# Patient Record
Sex: MALE | Race: WHITE | NOT HISPANIC OR LATINO | Employment: OTHER | URBAN - METROPOLITAN AREA
[De-identification: names, ages, dates, MRNs, and addresses within clinical notes are randomized per-mention and may not be internally consistent; named-entity substitution may affect disease eponyms.]

---

## 2023-05-18 DIAGNOSIS — K21.9 GERD WITHOUT ESOPHAGITIS: Primary | ICD-10-CM

## 2023-05-19 RX ORDER — OMEPRAZOLE 40 MG/1
CAPSULE, DELAYED RELEASE ORAL
Qty: 60 CAPSULE | Refills: 0 | Status: SHIPPED | OUTPATIENT
Start: 2023-05-19 | End: 2023-06-13

## 2023-06-02 ENCOUNTER — OFFICE VISIT (OUTPATIENT)
Dept: PRIMARY CARE | Facility: CLINIC | Age: 78
End: 2023-06-02
Payer: MEDICARE

## 2023-06-02 VITALS
HEIGHT: 72 IN | WEIGHT: 284 LBS | HEART RATE: 73 BPM | BODY MASS INDEX: 38.47 KG/M2 | TEMPERATURE: 97.3 F | SYSTOLIC BLOOD PRESSURE: 129 MMHG | OXYGEN SATURATION: 95 % | DIASTOLIC BLOOD PRESSURE: 69 MMHG

## 2023-06-02 DIAGNOSIS — E11.9 CONTROLLED TYPE 2 DIABETES MELLITUS WITHOUT COMPLICATION, WITHOUT LONG-TERM CURRENT USE OF INSULIN (MULTI): ICD-10-CM

## 2023-06-02 DIAGNOSIS — I10 ESSENTIAL HYPERTENSION: ICD-10-CM

## 2023-06-02 DIAGNOSIS — N40.1 BENIGN PROSTATIC HYPERPLASIA WITH LOWER URINARY TRACT SYMPTOMS, SYMPTOM DETAILS UNSPECIFIED: ICD-10-CM

## 2023-06-02 DIAGNOSIS — E04.2 NONTOXIC MULTINODULAR GOITER: ICD-10-CM

## 2023-06-02 DIAGNOSIS — R73.03 PREDIABETES: ICD-10-CM

## 2023-06-02 DIAGNOSIS — Z00.00 ROUTINE GENERAL MEDICAL EXAMINATION AT HEALTH CARE FACILITY: Primary | ICD-10-CM

## 2023-06-02 DIAGNOSIS — E78.2 MIXED HYPERLIPIDEMIA: ICD-10-CM

## 2023-06-02 DIAGNOSIS — E66.01 CLASS 2 SEVERE OBESITY DUE TO EXCESS CALORIES WITH SERIOUS COMORBIDITY AND BODY MASS INDEX (BMI) OF 38.0 TO 38.9 IN ADULT (MULTI): ICD-10-CM

## 2023-06-02 PROBLEM — J34.2 NASAL SEPTAL DEVIATION: Status: RESOLVED | Noted: 2023-06-02 | Resolved: 2023-06-02

## 2023-06-02 PROBLEM — H10.45 CHRONIC ALLERGIC CONJUNCTIVITIS: Status: RESOLVED | Noted: 2021-12-16 | Resolved: 2023-06-02

## 2023-06-02 PROBLEM — C44.90 MALIGNANT NEOPLASM OF SKIN: Status: RESOLVED | Noted: 2021-12-16 | Resolved: 2023-06-02

## 2023-06-02 PROBLEM — M17.0 PRIMARY OSTEOARTHRITIS OF BOTH KNEES: Status: RESOLVED | Noted: 2023-06-02 | Resolved: 2023-06-02

## 2023-06-02 PROBLEM — J32.0 CHRONIC MAXILLARY SINUSITIS: Status: RESOLVED | Noted: 2023-06-02 | Resolved: 2023-06-02

## 2023-06-02 PROBLEM — G89.29 CHRONIC LOW BACK PAIN WITH SCIATICA: Status: RESOLVED | Noted: 2018-02-08 | Resolved: 2023-06-02

## 2023-06-02 PROBLEM — Z96.1 PSEUDOPHAKIA OF BOTH EYES: Status: RESOLVED | Noted: 2021-01-22 | Resolved: 2023-06-02

## 2023-06-02 PROBLEM — K63.5 BENIGN COLONIC POLYP: Status: RESOLVED | Noted: 2023-06-02 | Resolved: 2023-06-02

## 2023-06-02 PROBLEM — N52.9 MALE ERECTILE DISORDER: Status: RESOLVED | Noted: 2023-06-02 | Resolved: 2023-06-02

## 2023-06-02 PROBLEM — H90.3 SENSORINEURAL HEARING LOSS, BILATERAL: Status: RESOLVED | Noted: 2023-06-02 | Resolved: 2023-06-02

## 2023-06-02 PROBLEM — K21.9 GASTROESOPHAGEAL REFLUX DISEASE: Status: ACTIVE | Noted: 2018-08-02

## 2023-06-02 PROBLEM — J32.2 CHRONIC ETHMOIDAL SINUSITIS: Status: RESOLVED | Noted: 2023-06-02 | Resolved: 2023-06-02

## 2023-06-02 PROBLEM — M54.40 CHRONIC LOW BACK PAIN WITH SCIATICA: Status: RESOLVED | Noted: 2018-02-08 | Resolved: 2023-06-02

## 2023-06-02 PROBLEM — M54.16 LUMBAR RADICULOPATHY: Status: RESOLVED | Noted: 2023-06-02 | Resolved: 2023-06-02

## 2023-06-02 PROBLEM — K57.30 DIVERTICULOSIS OF LARGE INTESTINE WITHOUT HEMORRHAGE: Status: RESOLVED | Noted: 2018-10-09 | Resolved: 2023-06-02

## 2023-06-02 PROBLEM — D72.820 MONOCLONAL B-CELL LYMPHOCYTOSIS: Status: ACTIVE | Noted: 2018-07-15

## 2023-06-02 PROBLEM — I25.10 NONOBSTRUCTIVE ATHEROSCLEROSIS OF CORONARY ARTERY: Status: ACTIVE | Noted: 2023-06-02

## 2023-06-02 PROBLEM — Z86.69 HISTORY OF BELL'S PALSY: Status: RESOLVED | Noted: 2021-12-16 | Resolved: 2023-06-02

## 2023-06-02 PROBLEM — M65.4 TENOSYNOVITIS, DE QUERVAIN: Status: RESOLVED | Noted: 2023-06-02 | Resolved: 2023-06-02

## 2023-06-02 PROBLEM — M48.061 SPINAL STENOSIS OF LUMBAR REGION: Status: RESOLVED | Noted: 2018-02-08 | Resolved: 2023-06-02

## 2023-06-02 PROBLEM — G47.33 OBSTRUCTIVE SLEEP APNEA SYNDROME: Status: ACTIVE | Noted: 2017-01-10

## 2023-06-02 LAB
CHOLESTEROL (MG/DL) IN SER/PLAS: 148 MG/DL (ref 0–199)
CHOLESTEROL IN HDL (MG/DL) IN SER/PLAS: 55.2 MG/DL
CHOLESTEROL/HDL RATIO: 2.7
ESTIMATED AVERAGE GLUCOSE FOR HBA1C: 114 MG/DL
HEMOGLOBIN A1C/HEMOGLOBIN TOTAL IN BLOOD: 5.6 %
LDL: 73 MG/DL (ref 0–99)
PROSTATE SPECIFIC AG (NG/ML) IN SER/PLAS: 1.78 NG/ML (ref 0–4)
THYROTROPIN (MIU/L) IN SER/PLAS BY DETECTION LIMIT <= 0.05 MIU/L: 2.84 MIU/L (ref 0.44–3.98)
TRIGLYCERIDE (MG/DL) IN SER/PLAS: 98 MG/DL (ref 0–149)
VLDL: 20 MG/DL (ref 0–40)

## 2023-06-02 PROCEDURE — G0439 PPPS, SUBSEQ VISIT: HCPCS | Performed by: INTERNAL MEDICINE

## 2023-06-02 PROCEDURE — 3074F SYST BP LT 130 MM HG: CPT | Performed by: INTERNAL MEDICINE

## 2023-06-02 PROCEDURE — 99214 OFFICE O/P EST MOD 30 MIN: CPT | Performed by: INTERNAL MEDICINE

## 2023-06-02 PROCEDURE — 83036 HEMOGLOBIN GLYCOSYLATED A1C: CPT

## 2023-06-02 PROCEDURE — 1170F FXNL STATUS ASSESSED: CPT | Performed by: INTERNAL MEDICINE

## 2023-06-02 PROCEDURE — 80061 LIPID PANEL: CPT

## 2023-06-02 PROCEDURE — G0444 DEPRESSION SCREEN ANNUAL: HCPCS | Performed by: INTERNAL MEDICINE

## 2023-06-02 PROCEDURE — 1036F TOBACCO NON-USER: CPT | Performed by: INTERNAL MEDICINE

## 2023-06-02 PROCEDURE — 1160F RVW MEDS BY RX/DR IN RCRD: CPT | Performed by: INTERNAL MEDICINE

## 2023-06-02 PROCEDURE — 1159F MED LIST DOCD IN RCRD: CPT | Performed by: INTERNAL MEDICINE

## 2023-06-02 PROCEDURE — 3078F DIAST BP <80 MM HG: CPT | Performed by: INTERNAL MEDICINE

## 2023-06-02 PROCEDURE — 84443 ASSAY THYROID STIM HORMONE: CPT

## 2023-06-02 PROCEDURE — 84153 ASSAY OF PSA TOTAL: CPT

## 2023-06-02 RX ORDER — MULTIVIT-MIN/IRON/FOLIC ACID/K 18-600-40
CAPSULE ORAL
COMMUNITY

## 2023-06-02 RX ORDER — ACETAMINOPHEN 160 MG/5ML
SUSPENSION, ORAL (FINAL DOSE FORM) ORAL
COMMUNITY

## 2023-06-02 RX ORDER — MONTELUKAST SODIUM 10 MG/1
TABLET ORAL
COMMUNITY
Start: 2014-04-09 | End: 2024-01-08

## 2023-06-02 RX ORDER — SEMAGLUTIDE 1.34 MG/ML
INJECTION, SOLUTION SUBCUTANEOUS
COMMUNITY
Start: 2022-07-18 | End: 2023-11-17

## 2023-06-02 RX ORDER — MULTIVIT-MIN/FA/LYCOPEN/LUTEIN .4-300-25
TABLET ORAL
COMMUNITY

## 2023-06-02 RX ORDER — ATORVASTATIN CALCIUM 40 MG/1
TABLET, FILM COATED ORAL
COMMUNITY
Start: 2013-07-18 | End: 2024-01-08

## 2023-06-02 RX ORDER — PSYLLIUM SEED (WITH DEXTROSE)
1 POWDER (GRAM) ORAL 2 TIMES DAILY
COMMUNITY

## 2023-06-02 RX ORDER — CHOLECALCIFEROL (VITAMIN D3) 125 MCG
CAPSULE ORAL
COMMUNITY

## 2023-06-02 RX ORDER — FLUTICASONE PROPIONATE 50 MCG
SPRAY, SUSPENSION (ML) NASAL
COMMUNITY
End: 2024-04-11 | Stop reason: SDUPTHER

## 2023-06-02 RX ORDER — LOSARTAN POTASSIUM AND HYDROCHLOROTHIAZIDE 25; 100 MG/1; MG/1
TABLET ORAL
COMMUNITY
Start: 2019-05-09 | End: 2024-01-08

## 2023-06-02 RX ORDER — TAMSULOSIN HYDROCHLORIDE 0.4 MG/1
CAPSULE ORAL
COMMUNITY
Start: 2021-06-16 | End: 2024-01-08

## 2023-06-02 RX ORDER — OLOPATADINE HYDROCHLORIDE 1 MG/ML
SOLUTION/ DROPS OPHTHALMIC
COMMUNITY
Start: 2021-12-16 | End: 2024-01-29

## 2023-06-02 RX ORDER — AMLODIPINE BESYLATE 10 MG/1
TABLET ORAL
COMMUNITY
Start: 2014-05-05 | End: 2024-01-08

## 2023-06-02 RX ORDER — CETIRIZINE HYDROCHLORIDE 10 MG/1
TABLET ORAL
COMMUNITY
Start: 2017-10-17 | End: 2023-11-17 | Stop reason: ALTCHOICE

## 2023-06-02 RX ORDER — ASPIRIN 81 MG/1
TABLET ORAL
COMMUNITY
Start: 2008-07-24

## 2023-06-02 RX ORDER — UBIDECARENONE 75 MG
CAPSULE ORAL
COMMUNITY

## 2023-06-02 ASSESSMENT — ENCOUNTER SYMPTOMS
DEPRESSION: 0
OCCASIONAL FEELINGS OF UNSTEADINESS: 1
LOSS OF SENSATION IN FEET: 1

## 2023-06-02 ASSESSMENT — ACTIVITIES OF DAILY LIVING (ADL)
GROCERY_SHOPPING: INDEPENDENT
BATHING: INDEPENDENT
TAKING_MEDICATION: INDEPENDENT
DRESSING: INDEPENDENT
DOING_HOUSEWORK: INDEPENDENT
MANAGING_FINANCES: INDEPENDENT

## 2023-06-02 ASSESSMENT — PATIENT HEALTH QUESTIONNAIRE - PHQ9
2. FEELING DOWN, DEPRESSED OR HOPELESS: NOT AT ALL
1. LITTLE INTEREST OR PLEASURE IN DOING THINGS: NOT AT ALL
SUM OF ALL RESPONSES TO PHQ9 QUESTIONS 1 AND 2: 0

## 2023-06-12 DIAGNOSIS — K21.9 GERD WITHOUT ESOPHAGITIS: ICD-10-CM

## 2023-06-13 RX ORDER — OMEPRAZOLE 40 MG/1
CAPSULE, DELAYED RELEASE ORAL
Qty: 60 CAPSULE | Refills: 0 | Status: SHIPPED | OUTPATIENT
Start: 2023-06-13 | End: 2023-08-31

## 2023-08-21 ENCOUNTER — OFFICE VISIT (OUTPATIENT)
Dept: PRIMARY CARE | Facility: CLINIC | Age: 78
End: 2023-08-21
Payer: MEDICARE

## 2023-08-21 VITALS
DIASTOLIC BLOOD PRESSURE: 68 MMHG | TEMPERATURE: 97.5 F | BODY MASS INDEX: 38.38 KG/M2 | WEIGHT: 283 LBS | HEART RATE: 77 BPM | OXYGEN SATURATION: 96 % | SYSTOLIC BLOOD PRESSURE: 131 MMHG

## 2023-08-21 DIAGNOSIS — L03.115 CELLULITIS OF RIGHT LOWER EXTREMITY: Primary | ICD-10-CM

## 2023-08-21 PROCEDURE — 1036F TOBACCO NON-USER: CPT | Performed by: INTERNAL MEDICINE

## 2023-08-21 PROCEDURE — 3078F DIAST BP <80 MM HG: CPT | Performed by: INTERNAL MEDICINE

## 2023-08-21 PROCEDURE — 3075F SYST BP GE 130 - 139MM HG: CPT | Performed by: INTERNAL MEDICINE

## 2023-08-21 PROCEDURE — 1160F RVW MEDS BY RX/DR IN RCRD: CPT | Performed by: INTERNAL MEDICINE

## 2023-08-21 PROCEDURE — 99214 OFFICE O/P EST MOD 30 MIN: CPT | Performed by: INTERNAL MEDICINE

## 2023-08-21 PROCEDURE — 1159F MED LIST DOCD IN RCRD: CPT | Performed by: INTERNAL MEDICINE

## 2023-08-21 RX ORDER — CEPHALEXIN 500 MG/1
500 CAPSULE ORAL 4 TIMES DAILY
Qty: 20 CAPSULE | Refills: 0 | Status: SHIPPED | OUTPATIENT
Start: 2023-08-21 | End: 2023-08-26

## 2023-08-21 NOTE — PROGRESS NOTES
Subjective   Papi Lou is a 78 y.o. male who presents for Joint Swelling.  HPI  Patient presents with concerns about redness and warmth on the right lower extremity.  Started about 3 days ago, initially felt itchy but then noticed some warmth and redness that started spreading from just above the ankle anteriorly lower below the calf.,  Slightly tender to the touch, using Benadryl for the itching.  No significant improvement with that.  No known exposures, no bites, no cuts or scratches.  No pain with walking, no calf tenderness, no chest pressure or shortness of breath.  No fevers chills or sweats.  Objective   /68   Pulse 77   Temp 36.4 °C (97.5 °F)   Wt 128 kg (283 lb)   SpO2 96%   BMI 38.38 kg/m²    Physical Exam  NAD  No lymphadenopathy  Heart is regular rate and rhythm  Lungs clear  Trace lower extremity edema right greater than left  Right lower leg anterior medial erythema swelling warmth with mild tenderness increased pitting edema without fluctuance or abscess, spreading outward without demarcated borders  Xerosis of the skin  Assessment/Plan     Patient has right lower extremity cellulitis, likely from introduction of bacteria under the skin xerosis and scratching.  Discussed future skin care.  Cephalexin 500 mg 4 times daily for 5 days as fairly limited infection at this point in time, seems to have been caught early significant risk factors for resistance.  Discussed red flags, he will let me know how he is progressing in the next 3 to 4 days.  Problem List Items Addressed This Visit    None           Drake Crow MD

## 2023-08-29 ENCOUNTER — OFFICE VISIT (OUTPATIENT)
Dept: PRIMARY CARE | Facility: CLINIC | Age: 78
End: 2023-08-29
Payer: MEDICARE

## 2023-08-29 VITALS
SYSTOLIC BLOOD PRESSURE: 134 MMHG | TEMPERATURE: 97.3 F | OXYGEN SATURATION: 95 % | HEART RATE: 70 BPM | DIASTOLIC BLOOD PRESSURE: 67 MMHG

## 2023-08-29 DIAGNOSIS — L03.115 CELLULITIS OF RIGHT LOWER EXTREMITY: Primary | ICD-10-CM

## 2023-08-29 PROCEDURE — 1159F MED LIST DOCD IN RCRD: CPT | Performed by: INTERNAL MEDICINE

## 2023-08-29 PROCEDURE — 1036F TOBACCO NON-USER: CPT | Performed by: INTERNAL MEDICINE

## 2023-08-29 PROCEDURE — 3078F DIAST BP <80 MM HG: CPT | Performed by: INTERNAL MEDICINE

## 2023-08-29 PROCEDURE — 99213 OFFICE O/P EST LOW 20 MIN: CPT | Performed by: INTERNAL MEDICINE

## 2023-08-29 PROCEDURE — 1160F RVW MEDS BY RX/DR IN RCRD: CPT | Performed by: INTERNAL MEDICINE

## 2023-08-29 PROCEDURE — 3075F SYST BP GE 130 - 139MM HG: CPT | Performed by: INTERNAL MEDICINE

## 2023-08-29 RX ORDER — CEPHALEXIN 500 MG/1
500 CAPSULE ORAL 4 TIMES DAILY
Qty: 40 CAPSULE | Refills: 0 | Status: SHIPPED | OUTPATIENT
Start: 2023-08-29 | End: 2023-09-08

## 2023-08-29 ASSESSMENT — ENCOUNTER SYMPTOMS
DIARRHEA: 0
CONSTIPATION: 0
FEVER: 0
SHORTNESS OF BREATH: 0
DYSURIA: 0
EYE PAIN: 0
COUGH: 0
POLYDIPSIA: 0
BACK PAIN: 0
NUMBNESS: 0
NAUSEA: 0
CHILLS: 0
LIGHT-HEADEDNESS: 0

## 2023-08-29 NOTE — PATIENT INSTRUCTIONS
- we will send cephalexin for a 10 day course- no need to take this unless you have obvious worsening

## 2023-08-29 NOTE — PROGRESS NOTES
Subjective   Patient ID: Papi Lou is a 78 y.o. male who presents for No chief complaint on file..    HPI 79yo M diagnosed with mild cellulitis on 8/21 started on 5day course of cephalexin 500mg QID. Seen today for persistent swelling of the R ankle. He started taking the cephalexin and saw improvement in the erythema and completed the course on 8/26 AM, the erythema is overall still improved, the swelling has persisted and remains more swollen than his baseline (has chronic R leg edema after his knee surgery). Painful on the posterior ankle above the achilles. No fevers.    Review of Systems   Constitutional:  Negative for chills and fever.   HENT:  Negative for hearing loss.    Eyes:  Negative for pain.   Respiratory:  Negative for cough and shortness of breath.    Cardiovascular:  Negative for chest pain and leg swelling.   Gastrointestinal:  Negative for constipation, diarrhea and nausea.   Endocrine: Negative for polydipsia.   Genitourinary:  Negative for dysuria.   Musculoskeletal:  Negative for back pain.   Neurological:  Negative for light-headedness and numbness.       Objective   There were no vitals taken for this visit.    Physical Exam  Constitutional:       General: He is not in acute distress.  Pulmonary:      Effort: No respiratory distress.   Abdominal:      General: There is no distension.   Musculoskeletal:         General: Swelling present.      Right lower leg: Edema present.   Skin:     Comments: R ankle with mild erythema and swelling pitting to just above the ankle, sparing the foot, mildly tender to palpation superior to the achilles tendon   Neurological:      General: No focal deficit present.         Assessment/Plan   79yo M hx recently diagnosed cellulitis of R ankle - sp treatment with 5d of cephalexin with overall improvement in the erythema though the ankle remains swollen - most likely cellulitis has resolved and he is left with some residual ankle edema which should self  resolve but will send with a course of cephalexin to take only if erythema worsens with instructions to call the office if so. Most likely chronic edema has been exacerbated by this bout of cellulitis, concern for DVT is low.    Plan:   - watch and wait, can take 10 day course of keflex is erythema and pain is worsening    Problem List Items Addressed This Visit    None  Visit Diagnoses       Cellulitis of right lower extremity    -  Primary    Relevant Medications    cephalexin (Keflex) 500 mg capsule

## 2023-08-30 NOTE — PROGRESS NOTES
I saw and evaluated the patient. I personally obtained the key and critical portions of the history and physical exam or was physically present for key and critical portions performed by the resident/fellow. I reviewed the resident/fellow's documentation and discussed the patient with the resident/fellow. I agree with the resident/fellow's medical decision making as documented in the note.    Drake Crow MD

## 2023-08-31 DIAGNOSIS — K21.9 GERD WITHOUT ESOPHAGITIS: ICD-10-CM

## 2023-08-31 RX ORDER — OMEPRAZOLE 40 MG/1
CAPSULE, DELAYED RELEASE ORAL
Qty: 60 CAPSULE | Refills: 1 | Status: SHIPPED | OUTPATIENT
Start: 2023-08-31 | End: 2023-11-29

## 2023-11-17 ENCOUNTER — OFFICE VISIT (OUTPATIENT)
Dept: PRIMARY CARE | Facility: CLINIC | Age: 78
End: 2023-11-17
Payer: MEDICARE

## 2023-11-17 VITALS
OXYGEN SATURATION: 95 % | WEIGHT: 282 LBS | HEART RATE: 73 BPM | TEMPERATURE: 97.3 F | DIASTOLIC BLOOD PRESSURE: 65 MMHG | BODY MASS INDEX: 38.25 KG/M2 | SYSTOLIC BLOOD PRESSURE: 134 MMHG

## 2023-11-17 DIAGNOSIS — I10 ESSENTIAL HYPERTENSION: ICD-10-CM

## 2023-11-17 DIAGNOSIS — I25.10 NONOBSTRUCTIVE ATHEROSCLEROSIS OF CORONARY ARTERY: ICD-10-CM

## 2023-11-17 DIAGNOSIS — K21.9 GASTROESOPHAGEAL REFLUX DISEASE WITHOUT ESOPHAGITIS: ICD-10-CM

## 2023-11-17 DIAGNOSIS — R26.81 GAIT INSTABILITY: Primary | ICD-10-CM

## 2023-11-17 DIAGNOSIS — E78.2 MIXED HYPERLIPIDEMIA: ICD-10-CM

## 2023-11-17 DIAGNOSIS — E66.01 MORBID (SEVERE) OBESITY DUE TO EXCESS CALORIES (MULTI): ICD-10-CM

## 2023-11-17 PROBLEM — E66.812 CLASS 2 SEVERE OBESITY DUE TO EXCESS CALORIES WITH SERIOUS COMORBIDITY AND BODY MASS INDEX (BMI) OF 38.0 TO 38.9 IN ADULT: Status: ACTIVE | Noted: 2018-02-08

## 2023-11-17 PROCEDURE — 3075F SYST BP GE 130 - 139MM HG: CPT | Performed by: INTERNAL MEDICINE

## 2023-11-17 PROCEDURE — 1159F MED LIST DOCD IN RCRD: CPT | Performed by: INTERNAL MEDICINE

## 2023-11-17 PROCEDURE — 1036F TOBACCO NON-USER: CPT | Performed by: INTERNAL MEDICINE

## 2023-11-17 PROCEDURE — 3078F DIAST BP <80 MM HG: CPT | Performed by: INTERNAL MEDICINE

## 2023-11-17 PROCEDURE — 99214 OFFICE O/P EST MOD 30 MIN: CPT | Performed by: INTERNAL MEDICINE

## 2023-11-17 PROCEDURE — 1160F RVW MEDS BY RX/DR IN RCRD: CPT | Performed by: INTERNAL MEDICINE

## 2023-11-17 RX ORDER — LEVOCETIRIZINE DIHYDROCHLORIDE 5 MG/1
TABLET, FILM COATED ORAL EVERY EVENING
COMMUNITY

## 2023-11-17 NOTE — PROGRESS NOTES
Subjective   Reason for Visit: Papi Lou is an 78 y.o. male here for follow-up    HPI  Past medical history includes BPH, BETY on CPAP, hypertension, hyperlipidemia, multinodular goiter, history of diverticulitis status post partial colectomy, monoclonal B- lymphocytes/CLL, prediabetes and morbid obesity.    Interim:  - seen here for cellulitis, resolved  - seen by Flora, 6/8; continue annual PSA checks  - Seen by Charles, 6/21/20223, annual follow-up with labs  - dermatology check up recently as well       In [place], DEC-APR, established with Dr. Kandis Jc may need new provider.     Has very strong family history of cardiovascular disease, father had 5 heart attacks with the initial around 50, 2 paternal uncles and his brother also had heart attack and/or heart disease.   He had a cardiac catheterization 11/07/2017 showing nonobstructive CAD, echocardiogram 10/19/2017 mostly unremarkable other than diastolic dysfunction.      He does have erectile dysfunction, not concerned at this time, previously prescribed Viagra.     Has been experiencing eye itching/watering and sneezing. Using Xyzal and Singulair. Allergic to grass. Improved with trial of allergy eye drops and Flonase.     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 Travel to [place] between December and April.  Remote tobacco use. No alcohol use.  Plays golf.  Elliptical 40 minutes daily.     Providers:  Orthopedic surgery-Dr. Chi  Hematology oncology-Dr. Soto  Dermatology-Dr. Gamble Kary  Spine surgery-Dr. Grzegorz Gaona, history of lumbar fusion  General surgery-Dr. Spicer ventral hernia repair  GI-Dr. Hay-->Rodney, colonoscopy and Gonzalez's esophagus   Urology - Dr. Hines  Patient Care Team:  Drake Crow MD as PCP - General  Drake Crow MD as PCP [facility]     Review of Systems    Objective   Vitals:  /65   Pulse 73   Temp 36.3 °C (97.3  °F)   Wt 128 kg (282 lb)   SpO2 95%   BMI 38.25 kg/m²       Physical Exam  Gen: NAD, pleasant, A&;Ox3  HEENT: PERRL, EOMI, MMM, OP clear  Neck: supple, no thyromegaly, no JVD, normal carotid upstroke  Pulm: lungs CTAB, good air movement  CV: RRR, no m/r/g, 2+ DP pulses  Abd: NABS, soft, NT, ND no HSM  Ext: 1+ bilateral lower extremity peripheral edema  Neuro: CN II-XII intact, no focal sensory or motor deficits, normal reflexes, slight issues with balance especially with turns    Assessment/Plan     BETY: Moderate with nocturnal hypoxia, 100% compliance on CPAP; using and benefitting from treatment; continue; s/p nasal surgery in 10/2022      Obesity: With prediabetes/IFG, trial of GLP-1 agonist with good response to therapy: 16 pounds since starting, but plateau and switched to compounding private distributor Tirzepatide 40cc weekly    Edema: mostly right, post TKA; recommend elevation and compression; moisturization to prevent recurrent cellulitis      Increased reflux, and hx of Gonzalez's: improved with Change PPI to omeprazole twice daily, refer back to GI if needed, last Upper endoscopy in 2022, no mention of Gonzalez's on endoscopy or pathology result; can try to wean back to daily     Hypertension: Adequately controlled on current regimen (based on home BP), monitor, continue amlodipine 10 mg and losartan-HCTZ 100-25 mg; work on CV exercise ( avg 30min/day on stationary recumbent bike)     CAD/hyperlipidemia: High risk, optimize risk factors and cholesterol levels, currently on atorvastatin 40 mg and aspirin 81 mg  - vascular duplex in 2021 showed no aneurysm     BPH and PSA elevation: now seeing Dr. Hines; PSA 6/2/2023      Balance: refer for PT (while in [place])     Health maintenance  -Last colonoscopy: 10/2018, repeat 5 years,  referred back to Dr. Stevens (MAY 2024)  -PSA: requests continued PSA screening (family hx), 1.78 ng/dL, 6/2/2023  -Smoking history: Remote, negative AAA JAN 2021  -Counseled  regarding diet and exercise  -Immunizations: annual flu, COVID x4, o/w utd  -Followup in 3-4 months  Problem List Items Addressed This Visit    None

## 2024-01-08 DIAGNOSIS — I10 ESSENTIAL HYPERTENSION: Primary | ICD-10-CM

## 2024-01-08 DIAGNOSIS — N40.1 BENIGN PROSTATIC HYPERPLASIA WITH LOWER URINARY TRACT SYMPTOMS, SYMPTOM DETAILS UNSPECIFIED: ICD-10-CM

## 2024-01-08 DIAGNOSIS — E78.2 MIXED HYPERLIPIDEMIA: ICD-10-CM

## 2024-01-08 DIAGNOSIS — J30.89 ENVIRONMENTAL AND SEASONAL ALLERGIES: ICD-10-CM

## 2024-01-08 RX ORDER — ATORVASTATIN CALCIUM 40 MG/1
40 TABLET, FILM COATED ORAL DAILY
Qty: 90 TABLET | Refills: 3 | Status: SHIPPED | OUTPATIENT
Start: 2024-01-08 | End: 2024-01-29

## 2024-01-08 RX ORDER — LOSARTAN POTASSIUM AND HYDROCHLOROTHIAZIDE 25; 100 MG/1; MG/1
1 TABLET ORAL DAILY
Qty: 90 TABLET | Refills: 3 | Status: SHIPPED | OUTPATIENT
Start: 2024-01-08 | End: 2024-01-29

## 2024-01-08 RX ORDER — TAMSULOSIN HYDROCHLORIDE 0.4 MG/1
CAPSULE ORAL
Qty: 90 CAPSULE | Refills: 3 | Status: SHIPPED | OUTPATIENT
Start: 2024-01-08 | End: 2024-01-29

## 2024-01-08 RX ORDER — MONTELUKAST SODIUM 10 MG/1
10 TABLET ORAL NIGHTLY
Qty: 90 TABLET | Refills: 3 | Status: SHIPPED | OUTPATIENT
Start: 2024-01-08 | End: 2024-01-29

## 2024-01-08 RX ORDER — AMLODIPINE BESYLATE 10 MG/1
10 TABLET ORAL DAILY
Qty: 90 TABLET | Refills: 3 | Status: SHIPPED | OUTPATIENT
Start: 2024-01-08 | End: 2024-01-29

## 2024-01-27 DIAGNOSIS — E78.2 MIXED HYPERLIPIDEMIA: ICD-10-CM

## 2024-01-27 DIAGNOSIS — H10.13 ALLERGIC CONJUNCTIVITIS OF BOTH EYES: Primary | ICD-10-CM

## 2024-01-27 DIAGNOSIS — J30.89 ENVIRONMENTAL AND SEASONAL ALLERGIES: ICD-10-CM

## 2024-01-27 DIAGNOSIS — N40.1 BENIGN PROSTATIC HYPERPLASIA WITH LOWER URINARY TRACT SYMPTOMS, SYMPTOM DETAILS UNSPECIFIED: ICD-10-CM

## 2024-01-27 DIAGNOSIS — I10 ESSENTIAL HYPERTENSION: ICD-10-CM

## 2024-01-29 RX ORDER — ATORVASTATIN CALCIUM 40 MG/1
40 TABLET, FILM COATED ORAL DAILY
Qty: 90 TABLET | Refills: 3 | Status: SHIPPED | OUTPATIENT
Start: 2024-01-29

## 2024-01-29 RX ORDER — MONTELUKAST SODIUM 10 MG/1
10 TABLET ORAL DAILY
Qty: 90 TABLET | Refills: 3 | Status: SHIPPED | OUTPATIENT
Start: 2024-01-29

## 2024-01-29 RX ORDER — TAMSULOSIN HYDROCHLORIDE 0.4 MG/1
0.4 CAPSULE ORAL DAILY
Qty: 90 CAPSULE | Refills: 3 | Status: SHIPPED | OUTPATIENT
Start: 2024-01-29

## 2024-01-29 RX ORDER — AMLODIPINE BESYLATE 10 MG/1
10 TABLET ORAL DAILY
Qty: 90 TABLET | Refills: 3 | Status: SHIPPED | OUTPATIENT
Start: 2024-01-29

## 2024-01-29 RX ORDER — OLOPATADINE HYDROCHLORIDE 1 MG/ML
SOLUTION/ DROPS OPHTHALMIC
Qty: 15 ML | Refills: 3 | Status: SHIPPED | OUTPATIENT
Start: 2024-01-29

## 2024-01-29 RX ORDER — LOSARTAN POTASSIUM AND HYDROCHLOROTHIAZIDE 25; 100 MG/1; MG/1
1 TABLET ORAL DAILY
Qty: 90 TABLET | Refills: 3 | Status: SHIPPED | OUTPATIENT
Start: 2024-01-29

## 2024-02-03 DIAGNOSIS — K21.9 GERD WITHOUT ESOPHAGITIS: ICD-10-CM

## 2024-02-05 RX ORDER — OMEPRAZOLE 40 MG/1
CAPSULE, DELAYED RELEASE ORAL
Qty: 180 CAPSULE | Refills: 1 | Status: SHIPPED | OUTPATIENT
Start: 2024-02-05

## 2024-04-11 ENCOUNTER — OFFICE VISIT (OUTPATIENT)
Dept: PRIMARY CARE | Facility: CLINIC | Age: 79
End: 2024-04-11
Payer: MEDICARE

## 2024-04-11 VITALS
DIASTOLIC BLOOD PRESSURE: 61 MMHG | WEIGHT: 268 LBS | TEMPERATURE: 97.6 F | BODY MASS INDEX: 36.35 KG/M2 | OXYGEN SATURATION: 94 % | HEART RATE: 68 BPM | SYSTOLIC BLOOD PRESSURE: 132 MMHG

## 2024-04-11 DIAGNOSIS — R73.03 PREDIABETES: ICD-10-CM

## 2024-04-11 DIAGNOSIS — E66.01 MORBID (SEVERE) OBESITY DUE TO EXCESS CALORIES (MULTI): ICD-10-CM

## 2024-04-11 DIAGNOSIS — R09.82 PND (POST-NASAL DRIP): ICD-10-CM

## 2024-04-11 DIAGNOSIS — E66.01 MORBID (SEVERE) OBESITY DUE TO EXCESS CALORIES (MULTI): Primary | ICD-10-CM

## 2024-04-11 DIAGNOSIS — J30.2 SEASONAL ALLERGIC RHINITIS, UNSPECIFIED TRIGGER: ICD-10-CM

## 2024-04-11 PROCEDURE — 1036F TOBACCO NON-USER: CPT | Performed by: INTERNAL MEDICINE

## 2024-04-11 PROCEDURE — 99214 OFFICE O/P EST MOD 30 MIN: CPT | Performed by: INTERNAL MEDICINE

## 2024-04-11 PROCEDURE — 3078F DIAST BP <80 MM HG: CPT | Performed by: INTERNAL MEDICINE

## 2024-04-11 PROCEDURE — 3075F SYST BP GE 130 - 139MM HG: CPT | Performed by: INTERNAL MEDICINE

## 2024-04-11 RX ORDER — FLUTICASONE PROPIONATE 50 MCG
2 SPRAY, SUSPENSION (ML) NASAL
Qty: 16 G | Refills: 3 | Status: SHIPPED | OUTPATIENT
Start: 2024-04-11

## 2024-04-11 RX ORDER — AZELASTINE 1 MG/ML
2 SPRAY, METERED NASAL DAILY
Qty: 30 ML | Refills: 12 | Status: SHIPPED | OUTPATIENT
Start: 2024-04-11 | End: 2025-04-11

## 2024-04-11 RX ORDER — TIRZEPATIDE 7.5 MG/.5ML
INJECTION, SOLUTION SUBCUTANEOUS
Refills: 0 | OUTPATIENT
Start: 2024-04-11

## 2024-04-11 NOTE — PROGRESS NOTES
Subjective   Reason for Visit: Papi Lou is an 78 y.o. male here for follow-up    HPI  Past medical history includes BPH, BETY on CPAP, hypertension, hyperlipidemia, multinodular goiter, history of diverticulitis status post partial colectomy, monoclonal B- lymphocytes/CLL, prediabetes and morbid obesity.    In [place], DEC-APR, just returned.  Established with Dr. Kandis Jc for visits while there.  Also saw ENT while there, had COVID in January and now with lingering cough and bouts of sneezing.  Currently just using Xyzal.  Stopped Zyrtec and Flonase.  Nose runs all the time and has PND.    Often experiencing eye itching/watering and sneezing. Using Xyzal and Singulair. Allergic to grass. Improved with trial of allergy eye drops and Flonase.    Getting tirzepatide through compounding pharmacy, would like to get it covered.  Has dropped 14lbs since NOV.     Has very strong family history of cardiovascular disease, father had 5 heart attacks with the initial around 50, 2 paternal uncles and his brother also had heart attack and/or heart disease.   He had a cardiac catheterization 11/07/2017 showing nonobstructive CAD, echocardiogram 10/19/2017 mostly unremarkable other than diastolic dysfunction.      He does have erectile dysfunction, not concerned at this time, previously prescribed Viagra.          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 Travel to [place] between December and April.  Remote tobacco use. No alcohol use.  Plays golf.  Elliptical 40 minutes daily.     Providers:  Orthopedic surgery-Dr. Chi  Hematology oncology-Dr. Soto, 6/21/20223, annual follow-up w/ labs  Dermatology-Dr. Gamble Kary  Spine surgery-Dr. Grzegorz Gaona, history of lumbar fusion  General surgery-Dr. Spicer ventral hernia repair  GI-Dr. Hay-->Rodney, colonoscopy and Gonzalez's esophagus   Urology - Dr. Hines  Patient Care Team:  Drake Crow MD as  PCP - General  D[place] Crow MD [place] PCP [place] [facility] [place]     Review of Systems    Objective   Vitals:  /61   Pulse 68   Temp 36.4 °C (97.6 °F)   Wt 122 kg (268 lb)   SpO2 94%   BMI 36.35 kg/m²       Physical Exam  Gen: NAD, pleasant, A&;Ox3  HEENT: PERRL, EOMI, MMM, OP clear, boggy, pale nares  Neck: supple, no thyromegaly, no JVD, normal carotid upstroke  Pulm: lungs CTAB, good air movement  CV: RRR, no m/r/g, 2+ DP pulses  Abd: NABS, soft, NT, ND no HSM  Ext: 1+ bilateral lower extremity peripheral edema  Neuro: CN II-XII intact, no focal sensory or motor deficits, normal reflexes, slight issues with balance especially with turns    Assessment/Plan     BETY: Moderate with nocturnal hypoxia, 100% compliance on CPAP; using and benefitting from treatment; continue; s/p nasal surgery in 10/2022      Obesity: With prediabetes/IFG, trial of GLP-1 agonist with good response to therapy: 30 pounds since starting, from compounding private distributor Tirzepatide 40cc weekly, will try to get Zepbound approved    PND/cough/allergic rhinitis:  - continue Xyzal and Singulair  - add Flonase and Astelin    Edema: mostly right, post TKA; recommend elevation and compression; moisturization to prevent recurrent cellulitis      Increased reflux, and hx of Gonzalez's: improved with Change PPI to omeprazole twice daily, refer back to GI if needed, last Upper endoscopy in 2022, no mention of Gonzalez's on endoscopy or pathology result; can try to wean back to daily     Hypertension: Adequately controlled on current regimen (based on home BP), monitor, continue amlodipine 10 mg and losartan-HCTZ 100-25 mg; work on CV exercise ( avg 30min/day on stationary recumbent bike)     CAD/hyperlipidemia: High risk, optimize risk factors and cholesterol levels, currently on atorvastatin 40 mg and aspirin 81 mg  - vascular duplex in 2021 showed no aneurysm     BPH and PSA elevation: now seeing Dr. Hines; PSA 6/2/2023       Balance: refer for PT (while in [place])     Health maintenance  -Last colonoscopy: 10/2018, repeat 5 years,  referred back to Dr. Stevens (MAY 2024)  -PSA: requests continued PSA screening (family hx), 1.78 ng/dL, 6/2/2023  -Smoking history: Remote, negative AAA JAN 2021  -Counseled regarding diet and exercise  -Immunizations: annual flu, COVID x4, o/w utd  -Followup in 3-4 months  Problem List Items Addressed This Visit    None

## 2024-06-04 ENCOUNTER — OFFICE VISIT (OUTPATIENT)
Dept: PRIMARY CARE | Facility: CLINIC | Age: 79
End: 2024-06-04
Payer: MEDICARE

## 2024-06-04 VITALS
WEIGHT: 266 LBS | OXYGEN SATURATION: 97 % | SYSTOLIC BLOOD PRESSURE: 123 MMHG | HEART RATE: 68 BPM | HEIGHT: 72 IN | DIASTOLIC BLOOD PRESSURE: 65 MMHG | BODY MASS INDEX: 36.03 KG/M2 | TEMPERATURE: 97.5 F

## 2024-06-04 DIAGNOSIS — K21.9 GASTROESOPHAGEAL REFLUX DISEASE WITHOUT ESOPHAGITIS: ICD-10-CM

## 2024-06-04 DIAGNOSIS — N40.1 BENIGN PROSTATIC HYPERPLASIA WITH LOWER URINARY TRACT SYMPTOMS, SYMPTOM DETAILS UNSPECIFIED: ICD-10-CM

## 2024-06-04 DIAGNOSIS — Z12.5 ENCOUNTER FOR PROSTATE CANCER SCREENING: ICD-10-CM

## 2024-06-04 DIAGNOSIS — E78.2 MIXED HYPERLIPIDEMIA: ICD-10-CM

## 2024-06-04 DIAGNOSIS — I25.10 NONOBSTRUCTIVE ATHEROSCLEROSIS OF CORONARY ARTERY: ICD-10-CM

## 2024-06-04 DIAGNOSIS — C91.10 CHRONIC LYMPHOCYTIC LEUKEMIA OF B-CELL TYPE NOT HAVING ACHIEVED REMISSION (MULTI): ICD-10-CM

## 2024-06-04 DIAGNOSIS — Z00.00 ROUTINE GENERAL MEDICAL EXAMINATION AT HEALTH CARE FACILITY: ICD-10-CM

## 2024-06-04 DIAGNOSIS — I10 ESSENTIAL HYPERTENSION: ICD-10-CM

## 2024-06-04 DIAGNOSIS — R26.81 GAIT INSTABILITY: ICD-10-CM

## 2024-06-04 DIAGNOSIS — Z00.00 ENCOUNTER FOR WELLNESS EXAMINATION: Primary | ICD-10-CM

## 2024-06-04 LAB
CHOLEST SERPL-MCNC: 138 MG/DL (ref 0–199)
CHOLESTEROL/HDL RATIO: 2.6
HDLC SERPL-MCNC: 52.8 MG/DL
LDLC SERPL CALC-MCNC: 68 MG/DL
NON HDL CHOLESTEROL: 85 MG/DL (ref 0–149)
PSA SERPL-MCNC: 1.4 NG/ML
TRIGL SERPL-MCNC: 84 MG/DL (ref 0–149)
VLDL: 17 MG/DL (ref 0–40)

## 2024-06-04 PROCEDURE — 99213 OFFICE O/P EST LOW 20 MIN: CPT | Performed by: INTERNAL MEDICINE

## 2024-06-04 PROCEDURE — 1159F MED LIST DOCD IN RCRD: CPT | Performed by: INTERNAL MEDICINE

## 2024-06-04 PROCEDURE — G0444 DEPRESSION SCREEN ANNUAL: HCPCS | Performed by: INTERNAL MEDICINE

## 2024-06-04 PROCEDURE — 80061 LIPID PANEL: CPT

## 2024-06-04 PROCEDURE — 84153 ASSAY OF PSA TOTAL: CPT

## 2024-06-04 PROCEDURE — 1036F TOBACCO NON-USER: CPT | Performed by: INTERNAL MEDICINE

## 2024-06-04 PROCEDURE — 1170F FXNL STATUS ASSESSED: CPT | Performed by: INTERNAL MEDICINE

## 2024-06-04 PROCEDURE — 3074F SYST BP LT 130 MM HG: CPT | Performed by: INTERNAL MEDICINE

## 2024-06-04 PROCEDURE — 1160F RVW MEDS BY RX/DR IN RCRD: CPT | Performed by: INTERNAL MEDICINE

## 2024-06-04 PROCEDURE — G0439 PPPS, SUBSEQ VISIT: HCPCS | Performed by: INTERNAL MEDICINE

## 2024-06-04 PROCEDURE — G0446 INTENS BEHAVE THER CARDIO DX: HCPCS | Performed by: INTERNAL MEDICINE

## 2024-06-04 PROCEDURE — G0447 BEHAVIOR COUNSEL OBESITY 15M: HCPCS | Performed by: INTERNAL MEDICINE

## 2024-06-04 PROCEDURE — 1123F ACP DISCUSS/DSCN MKR DOCD: CPT | Performed by: INTERNAL MEDICINE

## 2024-06-04 PROCEDURE — 36415 COLL VENOUS BLD VENIPUNCTURE: CPT

## 2024-06-04 PROCEDURE — 3078F DIAST BP <80 MM HG: CPT | Performed by: INTERNAL MEDICINE

## 2024-06-04 ASSESSMENT — PATIENT HEALTH QUESTIONNAIRE - PHQ9
2. FEELING DOWN, DEPRESSED OR HOPELESS: NOT AT ALL
SUM OF ALL RESPONSES TO PHQ9 QUESTIONS 1 AND 2: 0
1. LITTLE INTEREST OR PLEASURE IN DOING THINGS: NOT AT ALL

## 2024-06-04 ASSESSMENT — ACTIVITIES OF DAILY LIVING (ADL)
MANAGING_FINANCES: INDEPENDENT
TAKING_MEDICATION: INDEPENDENT
GROCERY_SHOPPING: INDEPENDENT
BATHING: INDEPENDENT
DOING_HOUSEWORK: INDEPENDENT
DRESSING: INDEPENDENT

## 2024-06-04 ASSESSMENT — ENCOUNTER SYMPTOMS
OCCASIONAL FEELINGS OF UNSTEADINESS: 1
DEPRESSION: 0
LOSS OF SENSATION IN FEET: 0

## 2024-06-04 NOTE — PROGRESS NOTES
Subjective   Patient ID: Papi Lou is a 78 y.o. male who presents for Medicare Annual Wellness Visit Subsequent.    Past medical history includes BPH, BETY on CPAP, hypertension, hyperlipidemia, multinodular goiter, history of diverticulitis status post partial colectomy, monoclonal B- lymphocytes/CLL, prediabetes and morbid obesity.    Last visit in 4/2024: discussed PND/allergies - seeing his A/I this week.  Getting tirzepatide from a compound pharmacy, currently 50cc per week.  Has been on it since mid-2023.  Started at around 300#, down about 35lbs.  Could eat healthier although appetite is controlled.         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 Travel to [place] between December and April.  Remote tobacco use. No alcohol use.  Trying to get back on his elliptical daily.     Providers:  Orthopedic surgery-Dr. Chi  Hematology oncology-Dr. Soto, 6/21/20223, annual follow-up w/ labs  Dermatology-Dr. Tye Preston  Spine surgery-Dr. Grzegorz Gaona, history of lumbar fusion  General surgery-Dr. Spicer ventral hernia repair  GI-Dr. Hay-->Rodney, colonoscopy and Gonzalez's esophagus   Urology - Dr. Hines  A/I -  Amie  Patient Care Team:  Drake Crow MD as PCP - General  Drake Crow MD as PCP [place] [facility] [place] [place]     Review of Systems    Objective   /65   Pulse 68   Temp 36.4 °C (97.5 °F)   Ht 1.829 m (6')   Wt 121 kg (266 lb)   SpO2 97%   BMI 36.08 kg/m²     Physical Exam  Gen: NAD, pleasant, A&;Ox3  HEENT: PERRL, EOMI, MMM, OP clear, boggy, pale nares  Neck: supple, no thyromegaly, no JVD, normal carotid upstroke  Pulm: lungs CTAB, good air movement  CV: RRR, no m/r/g, 2+ DP pulses  Abd: NABS, soft, NT, ND no HSM  Ext: 1+ bilateral lower extremity peripheral edema  Neuro: CN II-XII intact, no focal sensory or motor deficits, normal reflexes, slight issues with balance especially with turns         Assessment/Plan     BETY: Moderate with nocturnal hypoxia, 100% compliance on CPAP; using and benefitting from treatment; continue; s/p nasal surgery in 10/2022      Obesity: With prediabetes/IFG, trial of GLP-1 agonist with good response to therapy: 35 pounds since starting, from compounding private distributor Tirzepatide 50cc weekly     PND/cough/allergic rhinitis:  - continue Xyzal and Singulair  - added Flonase and Astelin  - currently meds on hold with increased cough pending allergy eval on Thursday     Edema: mostly right, post TKA; recommend elevation and compression; moisturization to prevent recurrent cellulitis      Increased reflux, and hx of Gonzalez's: improved with Change PPI to omeprazole twice daily, refer back to GI, last Upper endoscopy in 2022, no mention of Gonzalez's on endoscopy or pathology result; unable to wean down to daily and now with chronic cough     Hypertension: controlled on current regimen, continue amlodipine 10 mg and losartan-HCTZ 100-25 mg; work on CV exercise ( avg 30min/day on stationary recumbent bike)     CAD/hyperlipidemia: High risk, optimize risk factors and cholesterol levels, currently on atorvastatin 40 mg and aspirin 81 mg  - vascular duplex in 2021 showed no aneurysm     BPH and PSA elevation: now seeing Dr. Hines; PSA 6/2/2023      Balance: refer for PT     Health maintenance  -Last colonoscopy: 10/2018, repeat 5 years,  referred back to Dr. Stevens (MAY 2024)  -PSA: requests continued PSA screening (family hx), 1.78 ng/dL, 6/2/2023  -Smoking history: Remote, negative AAA JAN 2021  -Counseled regarding diet and exercise  -Immunizations: annual flu, COVID x4, o/w utd  -Followup in 3-4 months      BMI was above normal measurement. Current weight: 121 kg (266 lb)  Weight change since last visit (-) denotes wt loss -2 lbs   Weight loss needed to achieve BMI 25: 82.1 Lbs  Weight loss needed to achieve BMI 30: 45.3 Lbs  Provided instructions on dietary changes  Provided  instructions on exercise  Advised to Increase physical activity.  >15 minutes spent counseling    No cognitive concerns, occasionally forgets why he went into a room, names, etc   5-10 minutes were spent on screening for Depression.    The 10-year ASCVD risk score (Phil RODRIGUES, et al., 2019) is: 27.2%    Values used to calculate the score:      Age: 78 years      Sex: Male      Is Non- : No      Diabetic: No      Tobacco smoker: No      Systolic Blood Pressure: 123 mmHg      Is BP treated: Yes      HDL Cholesterol: 55.2 mg/dL      Total Cholesterol: 148 mg/dL  Cardiovascular risk discussed and modifiable risk factors addressed.  Discussion included options of pharmaceutical interventions and recommended lifestyle modifications, including nutritional choices, exercise, and elimination of habits contributing to risk.   >15 minutes spent on assessment and discussion.

## 2024-06-06 ENCOUNTER — CONSULT (OUTPATIENT)
Dept: ALLERGY | Facility: CLINIC | Age: 79
End: 2024-06-06
Payer: MEDICARE

## 2024-06-06 VITALS
HEART RATE: 76 BPM | BODY MASS INDEX: 36.08 KG/M2 | WEIGHT: 266.4 LBS | HEIGHT: 72 IN | SYSTOLIC BLOOD PRESSURE: 149 MMHG | DIASTOLIC BLOOD PRESSURE: 76 MMHG | TEMPERATURE: 98.1 F

## 2024-06-06 DIAGNOSIS — J31.0 CHRONIC RHINITIS: ICD-10-CM

## 2024-06-06 DIAGNOSIS — R09.82 POSTNASAL DRIP: Primary | ICD-10-CM

## 2024-06-06 PROCEDURE — 95004 PERQ TESTS W/ALRGNC XTRCS: CPT | Performed by: ALLERGY & IMMUNOLOGY

## 2024-06-06 PROCEDURE — 99204 OFFICE O/P NEW MOD 45 MIN: CPT | Performed by: ALLERGY & IMMUNOLOGY

## 2024-06-06 ASSESSMENT — ENCOUNTER SYMPTOMS
CONSTITUTIONAL NEGATIVE: 1
GASTROINTESTINAL NEGATIVE: 1
RHINORRHEA: 1
BACK PAIN: 1
EYES NEGATIVE: 1
RESPIRATORY NEGATIVE: 1
ALLERGIC/IMMUNOLOGIC NEGATIVE: 1
HEMATOLOGIC/LYMPHATIC NEGATIVE: 1
CARDIOVASCULAR NEGATIVE: 1
JOINT SWELLING: 1

## 2024-06-06 NOTE — PATIENT INSTRUCTIONS
Your allergy testing was negative for environmental allergens. You likely have non-allergic rhinitis.  Please see information provided on non-allergic rhinitis.    Continue  You may use Flonase 2 sprays each nostril once daily    Use Astelin 2 sprays each nostril twice daily    Technique for nasal spray administration:  First, look slightly down, breathe normally, you do not need to sniff while you spray.  To use the nose spray, place the tip in your nose with the opposite hand (left hand, right side of nose, right hand, left side of nose), and aim or point toward the outside of the nose.  Do not sniff or snort medication afterwards as this can cause most of the medication to be swallowed.  Rather, dab your nose with a tissue if any runs out.    May continue Xyzal 5 mg once daily if helpful     Please see ENT for evaluation of sinuses     Environmental Allergy Testing:  Test Results (wheal/flare in mm)    Controls  Controls  Histamine: 7x40  Negative: 0x0    Trees:  Trees  American Beech: 0x0  Cliff: 0x0  Birch: 0x0  Black [place]: 0x0  Arnold: 0x0  [place]: 0x0  Eastern [place]: 0x0  Elm: 0x0  Hickory: 0x0  Maple: 0x0  [place]: 0x0  Caballo: 0x0  Bourbon: 0x0  White poplar: 0x0     Grasses:  Grass  B[place]ia: 0x0  Bermuda: 0x0  Get: 0x0  KORT Grass Mix: 0x0  Sweet Vernal: 0x0    Weeds:  Weeds  Cocklebur: 0x0  Dandelion: 0x0  English Plantain: 0x0  Goldenrod: 0x0  Lambs Quarters: 0x0  Mugwort: 0x0  Pigweed: 0x0  Ragweed Mix: 0x0  Russian Thistle: 0x0  Yellow Dock: 0x0     Molds:  Molds  Alternaria: 0x0  Aspergillus: 0x0  Cladosporium: 0x0  Helminthosporium: 0x0  Penicillium: 0x0  Stemphyllium: 0x0    Animals/Dust Mite:  Animals  Cat: 0x0  AP dog: 0x0  [place] Dog: 0x0  Mouse: 0x0  Cockroach: 0x0  Dust Mite F: 0x0  Dust Mite P: 0x0

## 2024-06-06 NOTE — PROGRESS NOTES
"Papi Lou presents for initial evaluation today.      Papi Lou was seen at the request of Drake Crow MD for a chief complaint of ***; a report with my findings is being sent via written or electronic means to Drake Crow MD with my recommendations for treatment    {He/she (caps):60679} provides the following history:    77 yo PMH moderate BETY with 100% compliance with CPAP s/p septoplasty, turbinate reduction and nasal valve repair (Dr. Marina 10/13/22) , obesity, GERD, HTN, HLD, CAD, ED, BPH presenting for chronic cough, PND, and concern for AR.     Taking Xyzal and Singulair, PCP recently started flonase and astelin 6/4.   Taking omeprazole bid with difficulty weaning per PCP. Last EGD 2022 erythematous mucosa in antrum with unremarkable pathology, no e/o Gonzalez's.       Rhinitis:     Asthma:    Eczema:     Food allergy:     Venom allergy:      Drug allergy:       Environmental History:  Type of home:  {Home:76115}  Pets in the house: {Pets:43521::\"None\"}  Mold or moisture in the home: {Mold:06339::\"None\"}  Bedroom arianna: {Bedroom Floor:72516::\"Carpet\"}  Dust mite covers on bed:  {dust mite:79003::\"No\"}  Cigarette exposure in the home:  {YES (DEF)/NO:61555::\"No\"}  Occupation/School: ***    Pertinent Allergy/Immunology family history:      Review of Systems    Vital signs:  There were no vitals taken for this visit.    Physical Exam:  GENERAL: Alert, oriented and in no acute distress.     HEENT: EYES: No conjunctival injection or cobblestoning. Nose: nasal turbinates mildly edematous and are not boggy.  There is no mucous stranding, polyps, or blood    noted. EARS: Tympanic membranes are clear. MOUTH: moist and pink with no exudates, ulcers, or thrush. NECK: is supple, without adenopathy.  No upper airway stridor noted.       HEART: regular rate and rhythm.       LUNGS: Clear to auscultation bilaterally. No wheezing, rhonchi or rales.        ABDOMEN: Positive bowel sounds, soft, " nontender, nondistended.       EXTREMITIES: No clubbing or edema.        NEURO:  Normal affect.  Gait normal.      SKIN: No rash, hives, or angioedema noted      Impression:  No diagnosis found.        Assessment and Plan:

## 2024-06-06 NOTE — PROGRESS NOTES
Papi Lou presents for initial evaluation today.      He provides the following history:    He reports that he has a history of lifelong allergies.  He notes that he had hayfever when growing up.  He was tested about 40 years ago, and told that he had environmental allergens, mushroom and cantaloupe allergy by skin testing.  He never had reactions prior to this testing to mushroom.    He notes that he got COVID in January 2024.  He has had cough, postnasal drip since then.  He also has associated nasal congestion, rhinorrhea, and sneezing.  He is coughing up mucus.  He has tried Flonase, Astelin, and Xyzal which have helped.  He has seen Dr. Marina in ENT, last seeing him in April 2023 at which time he was found to have right nasal valve narrowing.    He is on Prilosec 40 mg twice daily for GERD.  He saw an ENT [facility] in [place] in February or March 2024 for earwax removal, and was told at that time that he may need another sinus procedure.  He was prescribed a nasal spray at that time which did not help.  He is not sure of the name of the spray.      He denies starting any new medications or supplements.  He takes the following supplements: Vitamin B12, vitamin C, vitamin D3, CoQ10, multivitamin      Asthma:  Denies     Eczema: Denies     Food allergy: avoids mushroom, cantaloupe, honeydew due to prior allergy testing 40 years ago    Venom allergy:  Large local reaction as a child; inflammatory reactions to mosquito bites    Drug allergy: Denies       Review of Systems   Constitutional: Negative.    HENT:  Positive for congestion, postnasal drip and rhinorrhea.    Eyes: Negative.    Respiratory: Negative.     Cardiovascular: Negative.    Gastrointestinal: Negative.    Musculoskeletal:  Positive for back pain and joint swelling.   Skin: Negative.    Allergic/Immunologic: Negative.    Hematological: Negative.         Vital signs:  /76 (BP Location: Right arm, Patient Position: Sitting, BP Cuff Size: Adult)    Pulse 76   Temp 36.7 °C (98.1 °F)   Ht 1.829 m (6')   Wt 121 kg (266 lb 6.4 oz)   BMI 36.13 kg/m²     Physical Exam:  GENERAL: Alert, oriented and in no acute distress.     HEENT: EYES: No conjunctival injection or cobblestoning. Nose: nasal turbinates are not edematous and are not boggy.  There is no mucous stranding, polyps, or blood    noted. EARS: Tympanic membranes are clear. MOUTH: moist and pink with no exudates, ulcers, or thrush. NECK: is supple, without adenopathy.  No upper airway stridor noted.       HEART: regular rate and rhythm.       LUNGS: Clear to auscultation bilaterally. No wheezing, rhonchi or rales.        ABDOMEN: Positive bowel sounds, soft, nontender, nondistended.       EXTREMITIES: No clubbing or edema.        NEURO:  Normal affect.  Gait normal.      SKIN: No rash, hives, or angioedema noted    Environmental Allergy Testing:  Test Results (wheal/flare in mm)    Controls  Controls  Histamine: 7x40  Negative: 0x0    Trees:  Trees  American Beech: 0x0  Cliff: 0x0  Birch: 0x0  Black [place]: 0x0  Bristow: 0x0  [place]: 0x0  [place]: 0x0  Elm: 0x0  Hickory: 0x0  Maple: 0x0  [place]: 0x0  [place]: 0x0  [place]: 0x0  White poplar: 0x0     Grasses:  Grass  [place]: 0x0  B[place]: 0x0  Get: 0x0  KORT Grass Mix: 0x0  Sweet Vernal: 0x0    Weeds:  Weeds  Cocklebur: 0x0  Dandelion: 0x0  English Plantain: 0x0  Goldenrod: 0x0  Lambs Quarters: 0x0  Mugwort: 0x0  Pigweed: 0x0  Ragweed Mix: 0x0  Russian Thistle: 0x0  Yellow Dock: 0x0     Molds:  Molds  Alternaria: 0x0  Aspergillus: 0x0  Cladosporium: 0x0  Helminthosporium: 0x0  Penicillium: 0x0  Stemphyllium: 0x0    Animals/Dust Mite:  Animals  Cat: 0x0  AP dog: 0x0  [place] Dog: 0x0  Mouse: 0x0  Cockroach: 0x0  Dust Mite F: 0x0  Dust Mite P: 0x0     Impression:  1. Postnasal drip    2. Chronic rhinitis      Assessment and Plan:  Environmental allergy testing is negative today.  We discussed causes of nonallergic rhinitis.  It is possible that  he has residual sinus inflammation post COVID infection.  Recommend continuing Flonase 2 sprays each nostril once daily, increasing Astelin to 2 sprays each nostril twice daily.  We reviewed proper nasal spray administration technique.  He may continue Xyzal 5 mg daily if he feels it is beneficial.  We discussed that Singulair (montelukast) has an FDA blackbox warning for mood changes/suicidal ideation so he may discontinue if he does not feel that it is providing significant benefit or if he has any of the side effects.  Recommend adding nasal saline sinus rinse with distilled water.  Given negative allergy testing and persistent sinus symptoms, recommend seeing ENT (given history of sinus structural abnormalities) if symptoms persist.    We would be happy to see him in follow-up as

## 2024-06-13 NOTE — PROGRESS NOTES
FUV    Last visit - 6/8/23     HISTORY OF PRESENT ILLNESS:   Papi Lou is a 78 y.o. male who is being seen today for annual FUV with PSA   -family history of metastatic prostate cancer in father and history of BPH   -on Tamsulosin 0.4mg    PSA trend:  1.40 on 6/4/24  1.78 on 6/2/23,   1.75 on 11/3/22  1.4 on 09/28/22   PAST MEDICAL HISTORY:  Past Medical History:   Diagnosis Date    Abnormal electrocardiogram (ECG) (EKG) 10/22/2013    Abnormal electrocardiogram    Abnormal finding of blood chemistry, unspecified 08/13/2013    Abnormal blood chemistry    Aneurysm of iliac artery (CMS-HCC) 04/25/2022    Iliac artery aneurysm, bilateral    Aneurysm of iliac artery (CMS-HCC) 04/25/2022    Iliac artery aneurysm, bilateral    Aneurysm of iliac artery (CMS-HCC) 04/25/2022    Iliac artery aneurysm, bilateral    Benign colonic polyp 06/02/2023    Chronic allergic conjunctivitis 12/16/2021    Chronic maxillary sinusitis 06/02/2023    Chronic rhinitis 04/09/2014    Rhinitis    Diverticulosis of large intestine without hemorrhage 10/09/2018    Last Assessment & Plan: Formatting of this note might be different from the original. Assessment: Is s/p jeimy-colectomy    History of Bell's palsy 12/16/2021    Lumbar radiculopathy 06/02/2023    Male erectile disorder 06/02/2023    Malignant neoplasm of skin 12/16/2021    Nasal septal deviation 06/02/2023    Personal history of other diseases of the digestive system     History of diverticulitis of colon    Personal history of other diseases of the musculoskeletal system and connective tissue 07/29/2013    History of bursitis    Personal history of other diseases of the nervous system and sense organs     History of sleep apnea    Personal history of pneumonia (recurrent) 03/05/2014    History of pneumonia    Pseudophakia of both eyes 01/22/2021    Rupture of popliteal cyst 06/17/2014    Baker's cyst, ruptured    Sensorineural hearing loss, bilateral 06/02/2023    Spinal stenosis  of lumbar region 02/08/2018    Last Assessment & Plan: Formatting of this note might be different from the original. 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       PAST SURGICAL HISTORY:  Past Surgical History:   Procedure Laterality Date    BACK SURGERY  10/01/2013    Back Surgery    CT HEAD ANGIO W AND WO IV CONTRAST  4/4/2013    CT HEAD ANGIO W AND WO IV CONTRAST 4/4/2013 [facility]    KNEE SURGERY  07/29/2013    Knee Surgery    OTHER SURGICAL HISTORY  12/13/2019    Laparoscopic partial colectomy    VENTRAL HERNIA REPAIR  07/29/2013    Ventral Hernia Repair        ALLERGIES:   Allergies   Allergen Reactions    Bee Venom Protein (Honey Bee) Anaphylaxis    Ace Inhibitors Cough        MEDICATIONS:   Current Outpatient Medications   Medication Instructions    amLODIPine (NORVASC) 10 mg, oral, Daily    ascorbic acid, vitamin C, 500 mg capsule oral    aspirin 81 mg EC tablet     atorvastatin (LIPITOR) 40 mg, oral, Daily    azelastine (Astelin) 137 mcg (0.1 %) nasal spray 2 sprays, Each Nostril, Daily, Use in each nostril as directed    BACILLUS COAGULANS-INULIN ORAL oral    cholecalciferol (Vitamin D-3) 125 MCG (5000 UT) capsule oral    coenzyme Q-10 200 mg capsule oral    cyanocobalamin (Vitamin B-12) 500 mcg tablet oral    fluticasone (Flonase Allergy Relief) 50 mcg/actuation nasal spray 2 sprays, Each Nostril, Daily RT    L. acidophilus/Bifid. animalis 15.5 billion cell capsule oral    levocetirizine (Xyzal) 5 mg tablet oral, Every evening    losartan-hydrochlorothiazide (Hyzaar) 100-25 mg tablet 1 tablet, oral, Daily    montelukast (SINGULAIR) 10 mg, oral, Daily    multivit-iron-minerals-folic acid (Centrum Silver) 0.4 mg-300 mcg- 250 mcg tab oral    olopatadine (Patanol) 0.1 %  "ophthalmic solution INSTILL ONE DROP INTO AFFECTED EYE TWICE DAILY    omeprazole (PriLOSEC) 40 mg DR capsule TAKE 1 CAPSULE TWICE DAILY 30 MINUTES BEFORE MEALS    psyllium husk, with sugar, (Metamucil, with sugar,) 3.4 gram/12 gram powder 1 capsule, oral, 2 times daily    tamsulosin (FLOMAX) 0.4 mg, oral, Daily    tirzepatide (weight loss) (ZEPBOUND) 7.5 mg, subcutaneous, Every 7 days        PHYSICAL EXAM:  There were no vitals taken for this visit.  Constitutional: Patient appears well-developed and well-nourished. No distress.    Pulmonary/Chest: Effort normal. No respiratory distress.   Abdominal: Soft, ND NT  : WNL  Musculoskeletal: Normal range of motion.    Neurological: Alert and oriented to person, place, and time.  Psychiatric: Normal mood and affect. Behavior is normal. Thought content normal.      Labs:  No results found for: \"TESTOSTERONE\"  Lab Results   Component Value Date    PSA 1.40 06/04/2024     No components found for: \"CBC\"  Lab Results   Component Value Date    CREATININE 1.09 11/16/2021     No components found for: \"TESTOTMS\"  No results found for: \"TESTF\"    Imaging:    PVR: 0    Discussion: PSA Results reviewed with patient. Patient reassured.Denies hematuria, dysuria, nocturia, flank pain, and bothersome frequency. Does note that sometimes he gets urgency. Pt is on a diuretic. Also drinks 3-4 cups of coffee and diet coke throughout the day.Denies any NTF or urgency at night. Advised that coffee and alcohol are bladder stimulants and encouraged to monitor fluid intakes. Erections not a priority. Will follow up in 1 year with PSA prior or sooner if urgency gets bothersome.     SUAD:8, AUA: 12  Assessment:      1. Benign prostatic hyperplasia, unspecified whether lower urinary tract symptoms present  Measure post void residual          Papi Lou is a 78 y.o. male here for FUV     Plan:   follow up in 1 year with PSA prior  All questions and concerns were addressed. Patient verbalizes " understanding and has no other questions at this time.     Scribe Attestation  By signing my name below, I, Fariha Wayne   attest that this documentation has been prepared under the direction and in the presence of Jd Hines MD.

## 2024-06-14 ENCOUNTER — OFFICE VISIT (OUTPATIENT)
Dept: UROLOGY | Facility: HOSPITAL | Age: 79
End: 2024-06-14
Payer: MEDICARE

## 2024-06-14 DIAGNOSIS — N40.0 BENIGN PROSTATIC HYPERPLASIA, UNSPECIFIED WHETHER LOWER URINARY TRACT SYMPTOMS PRESENT: Primary | ICD-10-CM

## 2024-06-14 PROCEDURE — G2211 COMPLEX E/M VISIT ADD ON: HCPCS | Performed by: UROLOGY

## 2024-06-14 PROCEDURE — 99213 OFFICE O/P EST LOW 20 MIN: CPT | Performed by: UROLOGY

## 2024-06-14 PROCEDURE — 51798 US URINE CAPACITY MEASURE: CPT | Performed by: UROLOGY

## 2024-06-14 PROCEDURE — 1123F ACP DISCUSS/DSCN MKR DOCD: CPT | Performed by: UROLOGY

## 2024-06-27 ENCOUNTER — OFFICE VISIT (OUTPATIENT)
Dept: ORTHOPEDIC SURGERY | Facility: HOSPITAL | Age: 79
End: 2024-06-27
Payer: MEDICARE

## 2024-06-27 DIAGNOSIS — M19.031 OSTEOARTHRITIS OF RIGHT WRIST, UNSPECIFIED OSTEOARTHRITIS TYPE: ICD-10-CM

## 2024-06-27 DIAGNOSIS — M67.431 GANGLION CYST OF VOLAR ASPECT OF RIGHT WRIST: Primary | ICD-10-CM

## 2024-06-27 PROCEDURE — 99203 OFFICE O/P NEW LOW 30 MIN: CPT | Performed by: ORTHOPAEDIC SURGERY

## 2024-06-27 PROCEDURE — 1036F TOBACCO NON-USER: CPT | Performed by: ORTHOPAEDIC SURGERY

## 2024-06-27 PROCEDURE — 99213 OFFICE O/P EST LOW 20 MIN: CPT | Performed by: ORTHOPAEDIC SURGERY

## 2024-06-27 PROCEDURE — 1159F MED LIST DOCD IN RCRD: CPT | Performed by: ORTHOPAEDIC SURGERY

## 2024-06-27 PROCEDURE — 1160F RVW MEDS BY RX/DR IN RCRD: CPT | Performed by: ORTHOPAEDIC SURGERY

## 2024-06-27 PROCEDURE — 1123F ACP DISCUSS/DSCN MKR DOCD: CPT | Performed by: ORTHOPAEDIC SURGERY

## 2024-06-27 ASSESSMENT — PAIN - FUNCTIONAL ASSESSMENT: PAIN_FUNCTIONAL_ASSESSMENT: 0-10

## 2024-06-27 ASSESSMENT — PAIN SCALES - GENERAL: PAINLEVEL_OUTOF10: 0 - NO PAIN

## 2024-06-28 ASSESSMENT — ENCOUNTER SYMPTOMS
ARTHRALGIAS: 0
WHEEZING: 0
TROUBLE SWALLOWING: 0
FEVER: 0
SINUS PAIN: 0
COLOR CHANGE: 0
JOINT SWELLING: 1
SHORTNESS OF BREATH: 0
FATIGUE: 0
CHILLS: 0

## 2024-06-28 NOTE — PROGRESS NOTES
Reason for Appointment  Chief Complaint   Patient presents with    Right Hand - Pain     History of Present Illness  Patient is a 78 y.o. male here today who we have not seen since 2021 for evaluation of his right wrist.  He has noticed a classic volar ganglion cyst has been slowly increasing in size over the last few months.  Previous x-rays have shown severe DJD in the hands.  The cyst is not bothering him and he is not having any pain. No specific injury he can recall.  No other changes in his past medical history, allergies, or medications.     Past Medical History:   Diagnosis Date    Abnormal electrocardiogram (ECG) (EKG) 10/22/2013    Abnormal electrocardiogram    Abnormal finding of blood chemistry, unspecified 08/13/2013    Abnormal blood chemistry    Aneurysm of iliac artery (CMS-HCC) 04/25/2022    Iliac artery aneurysm, bilateral    Aneurysm of iliac artery (CMS-HCC) 04/25/2022    Iliac artery aneurysm, bilateral    Aneurysm of iliac artery (CMS-HCC) 04/25/2022    Iliac artery aneurysm, bilateral    Benign colonic polyp 06/02/2023    Chronic allergic conjunctivitis 12/16/2021    Chronic maxillary sinusitis 06/02/2023    Chronic rhinitis 04/09/2014    Rhinitis    Diverticulosis of large intestine without hemorrhage 10/09/2018    Last Assessment & Plan: Formatting of this note might be different from the original. Assessment: Is s/p jeimy-colectomy    History of Bell's palsy 12/16/2021    Lumbar radiculopathy 06/02/2023    Male erectile disorder 06/02/2023    Malignant neoplasm of skin 12/16/2021    Nasal septal deviation 06/02/2023    Personal history of other diseases of the digestive system     History of diverticulitis of colon    Personal history of other diseases of the musculoskeletal system and connective tissue 07/29/2013    History of bursitis    Personal history of other diseases of the nervous system and sense organs     History of sleep apnea    Personal history of pneumonia (recurrent)  03/05/2014    History of pneumonia    Pseudophakia of both eyes 01/22/2021    Rupture of popliteal cyst 06/17/2014    Baker's cyst, ruptured    Sensorineural hearing loss, bilateral 06/02/2023    Spinal stenosis of lumbar region 02/08/2018    Last Assessment & Plan: Formatting of this note might be different from the original. 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 Sig Documenting Provider Last Dose Status   amLODIPine (Norvasc) 10 mg tablet [id number] Yes Take 1 tablet (10 mg) by mouth once daily. Drake Crow MD Taking Active   ascorbic acid, vitamin C, 500 mg capsule [id number] Yes Take by mouth. Historical Provider, MD Taking Active   aspirin 81 mg EC tablet [id number] Yes  Historical Provider, MD Taking Active   atorvastatin (Lipitor) 40 mg tablet [id number] Yes Take 1 tablet (40 mg) by mouth once daily. Drake Crow MD Taking Active   azelastine (Astelin) 137 mcg (0.1 %) nasal spray [id number] Yes Administer 2 sprays into each nostril once daily. Use in each nostril as directed Drake Crow MD Taking Active   BACILLUS COAGULANS-INULIN ORAL [id number] Yes Take by mouth. Historical Provider, MD Taking Active   cholecalciferol (Vitamin  D-3) 125 MCG (5000 UT) capsule [id number] Yes Take by mouth. Historical Provider, MD Taking Active   coenzyme Q-10 200 mg capsule [id number] Yes Take by mouth. Historical Provider, MD Taking Active   cyanocobalamin (Vitamin B-12) 500 mcg tablet [id number] Yes Take by mouth. Historical Provider, MD Taking Active   fluticasone (Flonase Allergy Relief) 50 mcg/actuation nasal spray [id number] Yes Administer 2 sprays into each nostril once daily. Drake Crow MD Taking Active   L. acidophilus/Bifid. animalis 15.5 billion cell capsule [id number] Yes Take by mouth. Historical Provider, MD Taking Active   levocetirizine (Xyzal) 5 mg tablet [id number] Yes Take by mouth once daily in the evening. Makayla Provider, MD Taking Active   losartan-hydrochlorothiazide (Hyzaar) 100-25 mg tablet [id number] Yes Take 1 tablet by mouth once daily. Darke Crow MD Taking Active   montelukast (Singulair) 10 mg tablet [id number] Yes Take 1 tablet (10 mg) by mouth once daily. Drake Crow MD Taking Active   multivit-iron-minerals-folic acid (Centrum Silver) 0.4 mg-300 mcg- 250 mcg tab [id number] Yes Take by mouth. Makayla Barksdale MD Taking Active   olopatadine (Patanol) 0.1 % ophthalmic solution [id number] Yes INSTILL ONE DROP INTO AFFECTED EYE TWICE DAILY Drake Crow MD Taking Active   omeprazole (PriLOSEC) 40 mg DR capsule [id number] Yes TAKE 1 CAPSULE TWICE DAILY 30 MINUTES BEFORE MEALS Drake Crow MD Taking Active   psyllium husk, with sugar, (Metamucil, with sugar,) 3.4 gram/12 gram powder [id number] Yes Take 1 capsule by mouth 2 times a day. Makayla Barksdale MD Taking Active   tamsulosin (Flomax) 0.4 mg 24 hr capsule [id number] Yes Take 1 capsule (0.4 mg) by mouth once daily. Drake Crow MD Taking Active   tirzepatide, weight loss, (Zepbound) 7.5 mg/0.5 mL injection [id number] Yes Inject 7.5 mg under the skin every 7 days. Drake Crow MD Taking Active                    Allergies   Allergen Reactions    Bee  Venom Protein (Honey Bee) Anaphylaxis    Ace Inhibitors Cough       Review of Systems   Constitutional:  Negative for chills, fatigue and fever.   HENT:  Negative for mouth sores, sinus pain and trouble swallowing.    Respiratory:  Negative for shortness of breath and wheezing.    Cardiovascular:  Negative for chest pain and leg swelling.   Musculoskeletal:  Positive for joint swelling. Negative for arthralgias.   Skin:  Negative for color change and pallor.     Exam   On exam patient is alert, awake, and in no acute distress.  Head is normocephalic, no JVD, no auditory wheezes.  He has decreased shoulder and elbow motion.  Significant DJD in the hands especially at the base of both thumbs.  Minimal CMC tenderness today.  Classic volar ganglion cyst with the radial aspect of the wrist, this is nontender to palpation and fairly firm.  Good digital motion with no triggering.  Good pulses and sensation in the upper extremity.  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  The cyst is not bothering him and due to its location, we would like to avoid any injections or surgery.  If the cyst continues to increase in size or begins to bother him he can follow-up with us at any point.    Written by Francia Jarvis saw, evaluated, and treated the patient with the PA

## 2024-07-16 DIAGNOSIS — K21.9 GERD WITHOUT ESOPHAGITIS: ICD-10-CM

## 2024-07-16 RX ORDER — OMEPRAZOLE 40 MG/1
CAPSULE, DELAYED RELEASE ORAL
Qty: 180 CAPSULE | Refills: 1 | Status: SHIPPED | OUTPATIENT
Start: 2024-07-16

## 2024-07-23 ENCOUNTER — OFFICE VISIT (OUTPATIENT)
Dept: PRIMARY CARE | Facility: CLINIC | Age: 79
End: 2024-07-23
Payer: MEDICARE

## 2024-07-23 VITALS
TEMPERATURE: 97.9 F | DIASTOLIC BLOOD PRESSURE: 76 MMHG | OXYGEN SATURATION: 98 % | SYSTOLIC BLOOD PRESSURE: 142 MMHG | WEIGHT: 265 LBS | BODY MASS INDEX: 35.89 KG/M2 | HEIGHT: 72 IN | HEART RATE: 66 BPM

## 2024-07-23 DIAGNOSIS — M54.16 ACUTE LEFT LUMBAR RADICULOPATHY: Primary | ICD-10-CM

## 2024-07-23 PROCEDURE — 3077F SYST BP >= 140 MM HG: CPT | Performed by: INTERNAL MEDICINE

## 2024-07-23 PROCEDURE — 1036F TOBACCO NON-USER: CPT | Performed by: INTERNAL MEDICINE

## 2024-07-23 PROCEDURE — 1159F MED LIST DOCD IN RCRD: CPT | Performed by: INTERNAL MEDICINE

## 2024-07-23 PROCEDURE — 1123F ACP DISCUSS/DSCN MKR DOCD: CPT | Performed by: INTERNAL MEDICINE

## 2024-07-23 PROCEDURE — 3078F DIAST BP <80 MM HG: CPT | Performed by: INTERNAL MEDICINE

## 2024-07-23 PROCEDURE — 99213 OFFICE O/P EST LOW 20 MIN: CPT | Performed by: INTERNAL MEDICINE

## 2024-07-23 RX ORDER — PREDNISONE 10 MG/1
TABLET ORAL
Qty: 15 TABLET | Refills: 0 | Status: SHIPPED | OUTPATIENT
Start: 2024-07-23 | End: 2024-07-27

## 2024-07-23 NOTE — PROGRESS NOTES
Subjective   Patient ID: Papi Lou is a 78 y.o. male who presents for Back Pain (Papi is here today with c/o LBP directed towards the Left side with pain radiating down his Left LE.  Pt had back surgery in 2015. ).    Pain running from left lower back/upper buttock down to toes.    Started about 3 days ago.   No recollection of injury or sudden onset.  Had similar in the right side in 2015.  Constant, better when sitting and resting.  Increasing with walking/standing.    Changing positions is very difficult.  Also with weight bearing.  Down lateral leg.    Using cane or walker.  Better with bending forward and leaning.  Takes 2 Aleve every 8 hours, helps some.  Is experiencing weakness and numbness.    Objective   Physical Exam    /76 (BP Location: Left arm, Patient Position: Sitting)   Pulse 66   Temp 36.6 °C (97.9 °F)   Ht 1.829 m (6')   Wt 120 kg (265 lb)   SpO2 98%   BMI 35.94 kg/m²      NAD, mild discomfort, antalgic gait  Normal and symmetric sensation in BLLE  5/5 symmetric dorsi/plantarflexion, knee and hip ext/flex  Neg Log roll, neg SLR    Assessment/Plan     Symptoms and exam susp for lumbar stenosis with radiculopathy vs sciatica:  - trial of prednisone taper and PT  - refer back to Dr. Gaona or pain management if no improvement    Drake Crow MD

## 2024-07-24 ENCOUNTER — APPOINTMENT (OUTPATIENT)
Dept: PRIMARY CARE | Facility: CLINIC | Age: 79
End: 2024-07-24
Payer: MEDICARE

## 2024-07-29 ENCOUNTER — PATIENT MESSAGE (OUTPATIENT)
Dept: PRIMARY CARE | Facility: CLINIC | Age: 79
End: 2024-07-29
Payer: MEDICARE

## 2024-07-29 DIAGNOSIS — M54.16 ACUTE LEFT LUMBAR RADICULOPATHY: ICD-10-CM

## 2024-07-30 RX ORDER — PREDNISONE 10 MG/1
TABLET ORAL
Qty: 15 TABLET | Refills: 0 | Status: SHIPPED | OUTPATIENT
Start: 2024-07-30 | End: 2024-08-03

## 2024-09-26 ENCOUNTER — APPOINTMENT (OUTPATIENT)
Dept: OTOLARYNGOLOGY | Facility: CLINIC | Age: 79
End: 2024-09-26
Payer: MEDICARE

## 2024-09-26 VITALS — WEIGHT: 272.9 LBS | BODY MASS INDEX: 36.96 KG/M2 | HEIGHT: 72 IN | TEMPERATURE: 97.8 F

## 2024-09-26 DIAGNOSIS — J30.0 VASOMOTOR RHINITIS: Primary | ICD-10-CM

## 2024-09-26 PROCEDURE — 1159F MED LIST DOCD IN RCRD: CPT | Performed by: NURSE PRACTITIONER

## 2024-09-26 PROCEDURE — 1160F RVW MEDS BY RX/DR IN RCRD: CPT | Performed by: NURSE PRACTITIONER

## 2024-09-26 PROCEDURE — 1036F TOBACCO NON-USER: CPT | Performed by: NURSE PRACTITIONER

## 2024-09-26 PROCEDURE — 99214 OFFICE O/P EST MOD 30 MIN: CPT | Performed by: NURSE PRACTITIONER

## 2024-09-26 PROCEDURE — 1123F ACP DISCUSS/DSCN MKR DOCD: CPT | Performed by: NURSE PRACTITIONER

## 2024-09-26 RX ORDER — IPRATROPIUM BROMIDE 21 UG/1
2 SPRAY, METERED NASAL DAILY
Qty: 30 ML | Refills: 12 | Status: SHIPPED | OUTPATIENT
Start: 2024-09-26 | End: 2025-09-26

## 2024-09-26 ASSESSMENT — PATIENT HEALTH QUESTIONNAIRE - PHQ9
1. LITTLE INTEREST OR PLEASURE IN DOING THINGS: NOT AT ALL
SUM OF ALL RESPONSES TO PHQ9 QUESTIONS 1 AND 2: 0
2. FEELING DOWN, DEPRESSED OR HOPELESS: NOT AT ALL

## 2024-09-26 NOTE — PROGRESS NOTES
Subjective   Patient ID: Papi Lou is a 79 y.o. male who presents for Nose Problem (Nose runs /Coughing for months/Feel like have yo blow nose all the time).  HPI  Patient presents today for evaluation of chronic nasal drainage.  He states that he has intermittent drainage in the left nostril.  This is worse right after using CPAP.  He also has frequent sneezing.  He denies having purulent drainage no facial pain or pressure, headache, diplopia.  Admits to having seasonal allergies for which he uses azelastine, Flonase, Xyzal.  These medications does not seem to help with the intermittent nasal drainage.  This has been going since January of this year.  Admits to having nasal surgery in the past with septal reconstruction.  No trauma to the nose.  Denies having any loss of sense or taste or smell or epistaxis.    He had allergy testing on 6/6/24 and are negative.      Review of Systems    All other systems have been reviewed and are negative for complaints except for those mentioned in history of present illness, past medical history and problem list       Objective   Physical Exam  CONSTITUTIONAL: No acute distress, normal facial features; No fever; no chills  VOICE: No hoarseness or other audible abnormality  RESPIRATION: Breathing comfortably, no stridor; normal breathing effort  CV: No cyanosis visible on the face and neck area  EYES:Pupils equal and round ; no erythema; conjunctiva clear; sclera white  NEURO: Alert and oriented, able to raise eyebrows symmetrical bilateral, smile with no facial droop, able to swallow  HEAD AND FACE: Symmetric facial features, no masses or lesions    Right ear examination: External ear normal.  EAC is clear.  TM intact with no effusion, retraction or perforation.  Left ear examination: External ear normal.  EAC is clear.  TM intact with no effusion, retraction or perforation.    NOSE: External nose midline; bilateral nasal turbinates decongested.  No polyps, mucopus or  purulence.  ORAL CAVITY: No lesions of external lips; uvula is midline; tongue with good mobility; no gross mass in oral cavity; mucosa appears pink   NECK/LYMPH: No obvious deformity or lesions; trachea is midline  PSYCH: Alert and oriented with appropriate mood and affect      Assessment/Plan       1. Vasomotor rhinitis  ipratropium (Atrovent) 21 mcg (0.03 %) nasal spray        I recommend to discontinue the azelastine and the Flonase at this time and try ipratropium bromide only along with Xyzal at nighttime.  We will trial this for 2 months.  Patient agrees with this plan.         SHEILA Pacheco 09/26/24 1:01 PM

## 2024-10-14 ENCOUNTER — APPOINTMENT (OUTPATIENT)
Dept: PRIMARY CARE | Facility: CLINIC | Age: 79
End: 2024-10-14
Payer: MEDICARE

## 2024-10-14 ENCOUNTER — OFFICE VISIT (OUTPATIENT)
Dept: SLEEP MEDICINE | Facility: CLINIC | Age: 79
End: 2024-10-14
Payer: COMMERCIAL

## 2024-10-14 ENCOUNTER — APPOINTMENT (OUTPATIENT)
Dept: RADIOLOGY | Facility: HOSPITAL | Age: 79
End: 2024-10-14
Payer: MEDICARE

## 2024-10-14 ENCOUNTER — HOSPITAL ENCOUNTER (OUTPATIENT)
Dept: RADIOLOGY | Facility: CLINIC | Age: 79
Discharge: HOME | End: 2024-10-14
Payer: MEDICARE

## 2024-10-14 VITALS
WEIGHT: 273 LBS | DIASTOLIC BLOOD PRESSURE: 73 MMHG | SYSTOLIC BLOOD PRESSURE: 130 MMHG | BODY MASS INDEX: 37.03 KG/M2 | TEMPERATURE: 98.1 F | HEART RATE: 72 BPM

## 2024-10-14 VITALS
OXYGEN SATURATION: 96 % | DIASTOLIC BLOOD PRESSURE: 73 MMHG | WEIGHT: 273.2 LBS | SYSTOLIC BLOOD PRESSURE: 130 MMHG | HEART RATE: 72 BPM | BODY MASS INDEX: 37 KG/M2 | HEIGHT: 72 IN | TEMPERATURE: 97.7 F

## 2024-10-14 DIAGNOSIS — E66.812 CLASS 2 SEVERE OBESITY DUE TO EXCESS CALORIES WITH SERIOUS COMORBIDITY AND BODY MASS INDEX (BMI) OF 36.0 TO 36.9 IN ADULT: ICD-10-CM

## 2024-10-14 DIAGNOSIS — R05.3 CHRONIC COUGH: ICD-10-CM

## 2024-10-14 DIAGNOSIS — G47.33 SLEEP APNEA, OBSTRUCTIVE: ICD-10-CM

## 2024-10-14 DIAGNOSIS — E66.01 CLASS 2 SEVERE OBESITY DUE TO EXCESS CALORIES WITH SERIOUS COMORBIDITY AND BODY MASS INDEX (BMI) OF 36.0 TO 36.9 IN ADULT: ICD-10-CM

## 2024-10-14 DIAGNOSIS — G47.33 OBSTRUCTIVE SLEEP APNEA SYNDROME: Primary | ICD-10-CM

## 2024-10-14 DIAGNOSIS — G47.33 SLEEP APNEA, OBSTRUCTIVE: Primary | ICD-10-CM

## 2024-10-14 DIAGNOSIS — I10 ESSENTIAL HYPERTENSION: ICD-10-CM

## 2024-10-14 PROCEDURE — 3075F SYST BP GE 130 - 139MM HG: CPT | Performed by: PHYSICIAN ASSISTANT

## 2024-10-14 PROCEDURE — 1123F ACP DISCUSS/DSCN MKR DOCD: CPT | Performed by: PHYSICIAN ASSISTANT

## 2024-10-14 PROCEDURE — 3078F DIAST BP <80 MM HG: CPT | Performed by: PHYSICIAN ASSISTANT

## 2024-10-14 PROCEDURE — 1159F MED LIST DOCD IN RCRD: CPT | Performed by: INTERNAL MEDICINE

## 2024-10-14 PROCEDURE — 99204 OFFICE O/P NEW MOD 45 MIN: CPT | Performed by: PHYSICIAN ASSISTANT

## 2024-10-14 PROCEDURE — 1123F ACP DISCUSS/DSCN MKR DOCD: CPT | Performed by: INTERNAL MEDICINE

## 2024-10-14 PROCEDURE — 71046 X-RAY EXAM CHEST 2 VIEWS: CPT | Performed by: RADIOLOGY

## 2024-10-14 PROCEDURE — 99213 OFFICE O/P EST LOW 20 MIN: CPT | Performed by: INTERNAL MEDICINE

## 2024-10-14 PROCEDURE — G2211 COMPLEX E/M VISIT ADD ON: HCPCS | Performed by: INTERNAL MEDICINE

## 2024-10-14 PROCEDURE — 3075F SYST BP GE 130 - 139MM HG: CPT | Performed by: INTERNAL MEDICINE

## 2024-10-14 PROCEDURE — 1036F TOBACCO NON-USER: CPT | Performed by: INTERNAL MEDICINE

## 2024-10-14 PROCEDURE — 71046 X-RAY EXAM CHEST 2 VIEWS: CPT

## 2024-10-14 PROCEDURE — 3078F DIAST BP <80 MM HG: CPT | Performed by: INTERNAL MEDICINE

## 2024-10-14 PROCEDURE — G0447 BEHAVIOR COUNSEL OBESITY 15M: HCPCS | Performed by: INTERNAL MEDICINE

## 2024-10-14 ASSESSMENT — ENCOUNTER SYMPTOMS
DEPRESSION: 0
LOSS OF SENSATION IN FEET: 0
OCCASIONAL FEELINGS OF UNSTEADINESS: 0

## 2024-10-14 NOTE — PROGRESS NOTES
Subjective   Patient ID: Papi Lou is a 79 y.o. male who presents for Follow-up.    Past medical history includes BPH, BETY on CPAP, hypertension, hyperlipidemia, multinodular goiter, history of diverticulitis status post partial colectomy, monoclonal B- lymphocytes/CLL, prediabetes and morbid obesity.    Interim:  - Dr. Douglass, A/I, negative testing, recommended nasal sprays and ENT follow-up which was completed 9/26/2024 adding ipratroprium  - Dr. Hines, 6/14/2024, PVR: 0ml, RTC 1 year with PSA  - seen here for back pain in JUL    Some swelling on left ankle after dropping steel object out of a box, still sore after a week.  Persistent cough since January.  Has not had laryngoscopic evaluation since prior to this for previous nasal issues.    Getting tirzepatide from a compound pharmacy, currently 60cc per week.  Has been on it since mid-2023.  Started at around 300#, down about 35lbs.  Actually a bit higher than last visit.  Could eat healthier although appetite is controlled.         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 Travel to [place] between December and April.  Remote tobacco use. No alcohol use.  Trying to get back on his elliptical daily.     Providers:  Orthopedic surgery-Dr. Chi  Hematology oncology-Dr. Soto, 6/21/20223, annual follow-up w/ labs  Dermatology-Dr. Tye Preston  Spine surgery-Dr. Grzegorz Gaona, history of lumbar fusion  General surgery-Dr. Spicer ventral hernia repair  GI-Dr. Hay-->Rodney, colonoscopy and Gonzalez's esophagus   Urology - Dr. Hines  A/I - Dr. Amie Yancey  ENT - Dr. Marina      Review of Systems    Objective   /73 (BP Location: Left arm, Patient Position: Sitting, BP Cuff Size: Adult)   Pulse 72   Temp 36.5 °C (97.7 °F) (Temporal)   Ht 1.829 m (6')   Wt 124 kg (273 lb 3.2 oz)   SpO2 96%   BMI 37.05 kg/m²     Physical Exam  Gen: NAD, pleasant, A&;Ox3  HEENT: PERRL, EOMI, MMM, OP  clear, boggy, pale nares  Neck: supple, no thyromegaly, no JVD, normal carotid upstroke  Pulm: lungs CTAB, good air movement  CV: RRR, no m/r/g, 2+ DP pulses  Abd: NABS, soft, NT, ND no HSM  Ext: 1+ bilateral lower extremity peripheral edema  Neuro: CN II-XII intact, no focal sensory or motor deficits, normal reflexes, slight issues with balance especially with turns        Assessment/Plan     BETY: Moderate with nocturnal hypoxia, 100% compliance on CPAP; using and benefitting from treatment; continue; s/p nasal surgery in 10/2022   - same CPAP for 20 years, does not check downloads, has never had settings adjusted from [id number].  - refer to sleep medicine and repeat HSAT     Obesity: With prediabetes/IFG, trial of GLP-1 agonist with good response to therapy: 35 pounds since starting, from compounding private distributor Tirzepatide 60cc weekly     PND/cough/allergic rhinitis:  - continue Xyzal  - added Flonase, ipratroprium and Astelin  - currently meds on hold with increased cough pending allergy eval on Thursday  - check CXR given chronic cough, follow-up with ENT     Edema: mostly right, post TKA; recommend elevation and compression; moisturization to prevent recurrent cellulitis      Increased reflux, and hx of Gonzalez's: improved with Change PPI to omeprazole twice daily, refer back to GI, last Upper endoscopy in 2022, no mention of Gonzalez's on endoscopy or pathology result; unable to wean down to daily and now with chronic cough     Hypertension: controlled on current regimen, continue amlodipine 10 mg and losartan-HCTZ 100-25 mg; work on CV exercise ( avg 30min/day on stationary recumbent bike)     CAD/hyperlipidemia: High risk, optimize risk factors and cholesterol levels, currently on atorvastatin 40 mg and aspirin 81 mg  - vascular duplex in 2021 showed no aneurysm     BPH and PSA elevation: now seeing Dr. Hines; PSA 6/4/2024      Balance: referred for PT     Health maintenance  -Last colonoscopy:  10/2018, repeat 5 years,  referred back to Dr. Stevens (MAY 2024)  -Smoking history: Remote, negative AAA JAN 2021  -Counseled regarding diet and exercise  -Immunizations: annual flu, COVID x4, o/w utd  -Followup in 6 months        BMI was above normal measurement. Current weight: 124 kg (273 lb 3.2 oz)  Weight change since last visit (-) denotes wt loss 0.3 lbs   Weight loss needed to achieve BMI 25: 89.3 Lbs  Weight loss needed to achieve BMI 30: 52.5 Lbs  Provided instructions on dietary changes and increasing activity.  >15 minutes spent counseling

## 2024-10-14 NOTE — PROGRESS NOTES
Patient: Papi Lou    [id number]  : 1945 -- AGE 79 y.o.    Provider: Kala Rainey PA-C     Location [facility]   Service Date: 10/14/2024              [facility]  New Visit Note      HISTORY OF PRESENT ILLNESS     The patient's referring provider is: Drake Crow MD; Drake Crow MD    HISTORY OF PRESENT ILLNESS   Papi Lou is a 79 y.o. male with h/o BETY, obesity, GERD, CLL, BPH, HTN, multiple comorbidities who presents to a [facility] for a sleep medicine evaluation with concerns of New Patient Visit.     Past Sleep History  Patient has had a sleep study in the past. The record is not available in clinic today. Diagnosed int he early .        Current History    On today's visit, the patient reports he is here for evaluation of sleep apnea. Referred by PCP. He was diagnosed with sleep apnea ~20 years ago. States his cpap device was set at 13cwp, has not followed with a sleep doctor in years and has not had his settings ever updated. Uses a nasal mask. Has an Airsense 11. DME company is [employer]. Does report mask is uncomfortable, leaves makring on his nose, has to put gauze in between mask and skin as a barrier, large leaks noted on DL and by patient/wife who are present.   Does not feel he is getting refreshing sleep due to numerous brief awakeniongs through out the night.   BEYT symptoms- sleepiness, wife notes grunting sound if not using cpap and patient may experience some gasping/choking sensation at times but does not believe he snores much.  With cpap, no grunting, sleepiness initially improved.   Does currently experience dry mouth, unsure if mouth is dropping open.     Denies RLS symptoms. Denies sleep walking,dream enactment or other parasomnia.     Does admit often dozing off in living room watching TV prior to bed time starting around 9PM or so, may last 30-45 minutes or sometimes  longer. Then wakes up and goes to bed.     Sleep schedule:  In bed: 11P or so AFTER having dozed usually in living room watching TV for some time  Subjective sleep latency:  10 minutes some nights, but ~half of the nights can be 30+ minutes  Awakenings during night:  6 - brief, unsure what wakes him up, ?possible cpap air leak   Length of awakenings:  very brief   Final awakening time:  6:45AM   Naps: 10A x30 minutes  + EVENING DOZING - living room/TV    Overall estimate of total sleep time: 7.5 hours   Weekends/Days off: same    Preferred sleeping position: SLEEP POSITION: sidelying    ESS:  9  ALEJO:  13  FOSQ: 35      REVIEW OF SYSTEMS     REVIEW OF SYSTEMS  See HPI; all other ROS were reviewed and negative for compliant        ALLERGIES AND MEDICATIONS     ALLERGIES  Allergies   Allergen Reactions    Bee Venom Protein (Honey Bee) Anaphylaxis    Ace Inhibitors Cough       MEDICATIONS  Current Outpatient Medications   Medication Sig Dispense Refill    amLODIPine (Norvasc) 10 mg tablet Take 1 tablet (10 mg) by mouth once daily. 90 tablet 3    ascorbic acid, vitamin C, 500 mg capsule Take by mouth.      aspirin 81 mg EC tablet       atorvastatin (Lipitor) 40 mg tablet Take 1 tablet (40 mg) by mouth once daily. 90 tablet 3    azelastine (Astelin) 137 mcg (0.1 %) nasal spray Administer 2 sprays into each nostril once daily. Use in each nostril as directed 30 mL 12    BACILLUS COAGULANS-INULIN ORAL Take by mouth.      cholecalciferol (Vitamin D-3) 125 MCG (5000 UT) capsule Take by mouth.      coenzyme Q-10 200 mg capsule Take by mouth.      cyanocobalamin (Vitamin B-12) 500 mcg tablet Take by mouth.      fluticasone (Flonase Allergy Relief) 50 mcg/actuation nasal spray Administer 2 sprays into each nostril once daily. 16 g 3    ipratropium (Atrovent) 21 mcg (0.03 %) nasal spray Administer 2 sprays into each nostril once daily. 30 mL 12    L. acidophilus/Bifid. animalis 15.5 billion cell capsule Take by mouth.       levocetirizine (Xyzal) 5 mg tablet Take by mouth once daily in the evening.      losartan-hydrochlorothiazide (Hyzaar) 100-25 mg tablet Take 1 tablet by mouth once daily. 90 tablet 3    multivit-iron-minerals-folic acid (Centrum Silver) 0.4 mg-300 mcg- 250 mcg tab Take by mouth.      olopatadine (Patanol) 0.1 % ophthalmic solution INSTILL ONE DROP INTO AFFECTED EYE TWICE DAILY 15 mL 3    omeprazole (PriLOSEC) 40 mg DR capsule TAKE 1 CAPSULE TWICE DAILY 30 MINUTES BEFORE MEALS 180 capsule 1    psyllium husk, with sugar, (Metamucil, with sugar,) 3.4 gram/12 gram powder Take 1 capsule by mouth 2 times a day.      tamsulosin (Flomax) 0.4 mg 24 hr capsule Take 1 capsule (0.4 mg) by mouth once daily. 90 capsule 3    tirzepatide, weight loss, (Zepbound) 7.5 mg/0.5 mL injection Inject 7.5 mg under the skin every 7 days. 1 each 0     No current facility-administered medications for this visit.         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), Benign colonic polyp (06/02/2023), Chronic allergic conjunctivitis (12/16/2021), Chronic maxillary sinusitis (06/02/2023), Chronic rhinitis (04/09/2014), Diverticulosis of large intestine without hemorrhage (10/09/2018), History of Bell's palsy (12/16/2021), Lumbar radiculopathy (06/02/2023), Male erectile disorder (06/02/2023), Malignant neoplasm of skin (12/16/2021), Nasal septal deviation (06/02/2023), Personal history of other diseases of the digestive system, Personal history of other diseases of the musculoskeletal system and connective tissue (07/29/2013), Personal history of other diseases of the nervous system and sense organs, Personal history of pneumonia (recurrent) (03/05/2014),  Pseudophakia of both eyes (01/22/2021), Rupture of popliteal cyst (06/17/2014), Sensorineural hearing loss, bilateral (06/02/2023), Spinal stenosis of lumbar region (02/08/2018), Spinal stenosis, lumbar region without neurogenic claudication (12/01/2015), and Toxic effect of venom of bees, accidental (unintentional), initial encounter.    PAST SURGICAL HISTORY:  Past Surgical History:   Procedure Laterality Date    BACK SURGERY  10/01/2013    Back Surgery    CT HEAD ANGIO W AND WO IV CONTRAST  4/4/2013    CT HEAD ANGIO W AND WO IV CONTRAST 4/4/2013 [facility]    KNEE SURGERY  07/29/2013    Knee Surgery    OTHER SURGICAL HISTORY  12/13/2019    Laparoscopic partial colectomy    VENTRAL HERNIA REPAIR  07/29/2013    Ventral Hernia Repair       FAMILY HISTORY  No family history on file.    He does not have a family history of sleep disorder.    SOCIAL HISTORY  He  reports that he has quit smoking. His smoking use included cigarettes. He has been exposed to tobacco smoke. He has never used smokeless tobacco. He reports current alcohol use. He reports that he does not use drugs. He    Caffeine consumption: Yes, 4 cups/day  Alcohol consumption: Yes, sometimes  Marijuana: No      PHYSICAL EXAM     VITAL SIGNS: /73   Pulse 72   Temp 36.7 °C (98.1 °F)   Wt 124 kg (273 lb)   BMI 37.03 kg/m²      CURRENT WEIGHT:   Vitals:    10/14/24 1509   Weight: 124 kg (273 lb)     Body mass index is 37.03 kg/m².  PREVIOUS WEIGHTS:  Wt Readings from Last 3 Encounters:   10/14/24 124 kg (273 lb)   10/14/24 124 kg (273 lb 3.2 oz)   09/26/24 124 kg (272 lb 14.4 oz)       Constitutional: Alert and oriented, cooperative, no acute distress  Head: Normocephalic, atraumatic   Cranial Features: No abnormal craniofacial features  Neck: Supple. Trachea midline.  Pulmonary: Non-labored breathing, speaks in full sentences.   Cardiac: regular rate   Extremities: No clubbing, no edema  Neuromuscular: Cranial nerves grossly intact, no  focal deficits      RESULTS/DATA     Bicarbonate (mmol/L)   Date Value   11/16/2021 31   07/30/2021 30   06/16/2021 29     CPAP data  Setting: cpap 13  Use: 364/365 days  Average time used: 7.7 hours  Mask: Eson 2, medium  rAHI 6.6  Large leak noted ~100L/min        ASSESSMENT/PLAN     Mr. Lou is a 79 y.o. male and He was referred to the [facility] for evaluation of BETY    Problem List, Orders, Assessment, Recommendations:  Problem List Items Addressed This Visit             ICD-10-CM    Obstructive sleep apnea syndrome - Primary G47.33     -diagnosed early 2000s, testing not on file  -very compliant with cpap, uses nightly and rAHI ~6 with large leak   -troubleshooting in clinic --> provided P30i sample fitted to medium cushion --> did very well with this and found much more comfortable; not major air leak noted during troubleshoot  -adjust pressure settings manually + will follow with an order to [facility] from CPAP 13 --> AutoPAP 6-13cwp - liked this change as well during clinic, felt much better than the fixed cpap setting  -discussed that PCP hd ordered HSAT. At this time, it is reasonable to forgo the testing as he is not in need of new equipment, his rAHI was decently controlled and issue more seems to be comfort  -However, if he struggles with use over the next several weeks, we will reconsider study, potentially in lab/split night or titration   -aware to bring all equpiment to future appointments  -will notify DME of mask sample provided + request the Climate line tubing per patient request and ease of connecting to the device  -avoid drowsy driving           Other Visit Diagnoses         Codes    Sleep apnea, obstructive     G47.33            Disposition    Leaving for [place] in mid December  Feels changes made today are helpful and will plan to follow up with he returns ~April/may  -however, if he continues to experience issues to let me know asap so  we can try to get him back in before he leaves; he understands he will need to give several weeks notice for this

## 2024-10-14 NOTE — PATIENT INSTRUCTIONS
- Please schedule your colonoscopy  - Follow-up with ENT and sleep medicine  - Chest xray ordered to further evaluate chronic cough  - RTC in 6 months, sooner if needed

## 2024-10-15 NOTE — ASSESSMENT & PLAN NOTE
-diagnosed early 2000s, testing not on file  -very compliant with cpap, uses nightly and rAHI ~6 with large leak   -troubleshooting in clinic --> provided P30i sample fitted to medium cushion --> did very well with this and found much more comfortable; not major air leak noted during troubleshoot  -adjust pressure settings manually + will follow with an order to [facility] from CPAP 13 --> AutoPAP 6-13cwp - liked this change as well during clinic, felt much better than the fixed cpap setting  -discussed that PCP hd ordered HSAT. At this time, it is reasonable to forgo the testing as he is not in need of new equipment, his rAHI was decently controlled and issue more seems to be comfort  -However, if he struggles with use over the next several weeks, we will reconsider study, potentially in lab/split night or titration   -aware to bring all equpiment to future appointments  -will notify DME of mask sample provided + request the Climate line tubing per patient request and ease of connecting to the device  -avoid drowsy driving

## 2024-12-31 DIAGNOSIS — N40.1 BENIGN PROSTATIC HYPERPLASIA WITH LOWER URINARY TRACT SYMPTOMS, SYMPTOM DETAILS UNSPECIFIED: ICD-10-CM

## 2024-12-31 DIAGNOSIS — I10 ESSENTIAL HYPERTENSION: ICD-10-CM

## 2024-12-31 DIAGNOSIS — E78.2 MIXED HYPERLIPIDEMIA: ICD-10-CM

## 2025-01-01 RX ORDER — LOSARTAN POTASSIUM AND HYDROCHLOROTHIAZIDE 25; 100 MG/1; MG/1
1 TABLET ORAL DAILY
Qty: 90 TABLET | Refills: 3 | Status: SHIPPED | OUTPATIENT
Start: 2025-01-01

## 2025-01-01 RX ORDER — ATORVASTATIN CALCIUM 40 MG/1
40 TABLET, FILM COATED ORAL DAILY
Qty: 90 TABLET | Refills: 3 | Status: SHIPPED | OUTPATIENT
Start: 2025-01-01

## 2025-01-01 RX ORDER — TAMSULOSIN HYDROCHLORIDE 0.4 MG/1
0.4 CAPSULE ORAL DAILY
Qty: 90 CAPSULE | Refills: 3 | Status: SHIPPED | OUTPATIENT
Start: 2025-01-01

## 2025-01-01 RX ORDER — AMLODIPINE BESYLATE 10 MG/1
10 TABLET ORAL DAILY
Qty: 90 TABLET | Refills: 3 | Status: SHIPPED | OUTPATIENT
Start: 2025-01-01

## 2025-01-09 DIAGNOSIS — E66.01 MORBID (SEVERE) OBESITY DUE TO EXCESS CALORIES (MULTI): Primary | ICD-10-CM

## 2025-01-09 DIAGNOSIS — R73.03 PREDIABETES: ICD-10-CM

## 2025-01-09 RX ORDER — TIRZEPATIDE 7.5 MG/.5ML
7.5 INJECTION, SOLUTION SUBCUTANEOUS WEEKLY
Qty: 0.5 ML | Refills: 3 | Status: SHIPPED | OUTPATIENT
Start: 2025-01-09

## 2025-01-13 DIAGNOSIS — R73.03 PREDIABETES: Primary | ICD-10-CM

## 2025-01-13 DIAGNOSIS — E66.01 MORBID (SEVERE) OBESITY DUE TO EXCESS CALORIES (MULTI): ICD-10-CM

## 2025-01-13 DIAGNOSIS — K21.9 GERD WITHOUT ESOPHAGITIS: ICD-10-CM

## 2025-01-13 RX ORDER — OMEPRAZOLE 40 MG/1
CAPSULE, DELAYED RELEASE ORAL
Qty: 180 CAPSULE | Refills: 1 | Status: SHIPPED | OUTPATIENT
Start: 2025-01-13

## 2025-01-13 RX ORDER — TIRZEPATIDE 7.5 MG/.5ML
7.5 INJECTION, SOLUTION SUBCUTANEOUS WEEKLY
Qty: 0.5 ML | Refills: 3 | Status: SHIPPED | OUTPATIENT
Start: 2025-01-13

## 2025-05-05 ENCOUNTER — APPOINTMENT (OUTPATIENT)
Dept: PRIMARY CARE | Facility: CLINIC | Age: 80
End: 2025-05-05
Payer: COMMERCIAL

## 2025-05-05 ENCOUNTER — APPOINTMENT (OUTPATIENT)
Dept: SLEEP MEDICINE | Facility: CLINIC | Age: 80
End: 2025-05-05
Payer: COMMERCIAL

## 2025-05-05 VITALS
WEIGHT: 269.8 LBS | BODY MASS INDEX: 36.59 KG/M2 | OXYGEN SATURATION: 95 % | SYSTOLIC BLOOD PRESSURE: 146 MMHG | HEART RATE: 68 BPM | DIASTOLIC BLOOD PRESSURE: 70 MMHG

## 2025-05-05 VITALS
OXYGEN SATURATION: 97 % | DIASTOLIC BLOOD PRESSURE: 74 MMHG | HEART RATE: 79 BPM | BODY MASS INDEX: 36.21 KG/M2 | SYSTOLIC BLOOD PRESSURE: 134 MMHG | WEIGHT: 267 LBS | TEMPERATURE: 97.3 F

## 2025-05-05 DIAGNOSIS — C91.10 CHRONIC LYMPHOCYTIC LEUKEMIA OF B-CELL TYPE NOT HAVING ACHIEVED REMISSION (MULTI): ICD-10-CM

## 2025-05-05 DIAGNOSIS — E66.812 CLASS 2 SEVERE OBESITY DUE TO EXCESS CALORIES WITH SERIOUS COMORBIDITY AND BODY MASS INDEX (BMI) OF 36.0 TO 36.9 IN ADULT: ICD-10-CM

## 2025-05-05 DIAGNOSIS — K21.9 GASTROESOPHAGEAL REFLUX DISEASE WITHOUT ESOPHAGITIS: ICD-10-CM

## 2025-05-05 DIAGNOSIS — G47.09 OTHER INSOMNIA: ICD-10-CM

## 2025-05-05 DIAGNOSIS — I10 ESSENTIAL HYPERTENSION: Primary | ICD-10-CM

## 2025-05-05 DIAGNOSIS — N40.1 BENIGN PROSTATIC HYPERPLASIA WITH LOWER URINARY TRACT SYMPTOMS, SYMPTOM DETAILS UNSPECIFIED: ICD-10-CM

## 2025-05-05 DIAGNOSIS — R26.81 GAIT INSTABILITY: ICD-10-CM

## 2025-05-05 DIAGNOSIS — E78.2 MIXED HYPERLIPIDEMIA: ICD-10-CM

## 2025-05-05 DIAGNOSIS — E66.01 CLASS 2 SEVERE OBESITY DUE TO EXCESS CALORIES WITH SERIOUS COMORBIDITY AND BODY MASS INDEX (BMI) OF 36.0 TO 36.9 IN ADULT: ICD-10-CM

## 2025-05-05 DIAGNOSIS — G47.33 SLEEP APNEA, OBSTRUCTIVE: ICD-10-CM

## 2025-05-05 DIAGNOSIS — G47.33 OBSTRUCTIVE SLEEP APNEA SYNDROME: Primary | ICD-10-CM

## 2025-05-05 DIAGNOSIS — R73.03 PREDIABETES: ICD-10-CM

## 2025-05-05 DIAGNOSIS — I25.10 NONOBSTRUCTIVE ATHEROSCLEROSIS OF CORONARY ARTERY: ICD-10-CM

## 2025-05-05 PROCEDURE — 1159F MED LIST DOCD IN RCRD: CPT | Performed by: INTERNAL MEDICINE

## 2025-05-05 PROCEDURE — 3078F DIAST BP <80 MM HG: CPT | Performed by: PHYSICIAN ASSISTANT

## 2025-05-05 PROCEDURE — 3078F DIAST BP <80 MM HG: CPT | Performed by: INTERNAL MEDICINE

## 2025-05-05 PROCEDURE — 3075F SYST BP GE 130 - 139MM HG: CPT | Performed by: INTERNAL MEDICINE

## 2025-05-05 PROCEDURE — 99215 OFFICE O/P EST HI 40 MIN: CPT | Performed by: PHYSICIAN ASSISTANT

## 2025-05-05 PROCEDURE — 1159F MED LIST DOCD IN RCRD: CPT | Performed by: PHYSICIAN ASSISTANT

## 2025-05-05 PROCEDURE — 1036F TOBACCO NON-USER: CPT | Performed by: INTERNAL MEDICINE

## 2025-05-05 PROCEDURE — 3077F SYST BP >= 140 MM HG: CPT | Performed by: PHYSICIAN ASSISTANT

## 2025-05-05 PROCEDURE — 99214 OFFICE O/P EST MOD 30 MIN: CPT | Performed by: INTERNAL MEDICINE

## 2025-05-05 PROCEDURE — 1123F ACP DISCUSS/DSCN MKR DOCD: CPT | Performed by: PHYSICIAN ASSISTANT

## 2025-05-05 PROCEDURE — 1123F ACP DISCUSS/DSCN MKR DOCD: CPT | Performed by: INTERNAL MEDICINE

## 2025-05-05 PROCEDURE — G2211 COMPLEX E/M VISIT ADD ON: HCPCS | Performed by: INTERNAL MEDICINE

## 2025-05-05 ASSESSMENT — ENCOUNTER SYMPTOMS
DEPRESSION: 0
OCCASIONAL FEELINGS OF UNSTEADINESS: 0
LOSS OF SENSATION IN FEET: 0

## 2025-05-05 ASSESSMENT — PATIENT HEALTH QUESTIONNAIRE - PHQ9
SUM OF ALL RESPONSES TO PHQ9 QUESTIONS 1 AND 2: 0
1. LITTLE INTEREST OR PLEASURE IN DOING THINGS: NOT AT ALL
2. FEELING DOWN, DEPRESSED OR HOPELESS: NOT AT ALL

## 2025-05-05 NOTE — PROGRESS NOTES
Subjective   Patient ID: Papi Lou is a 79 y.o. male who presents for Follow-up ( 6 months follow up).    Past medical history includes BPH, BETY on CPAP, hypertension, hyperlipidemia, multinodular goiter, history of diverticulitis status post partial colectomy, monoclonal B- lymphocytes/CLL, prediabetes and morbid obesity.    Interim:  - was in [place]; had follow-up with Dr. Jc, returned 3-4 weeks ago    Ongoing rhinitis; stopped using Flonase and Astelin due to perceived lack of benefit.  At some point he also stopped Singulair, possibly after Allergy evaluation.    Concerned about balance, loses balance easily, doesn't always walk straight, uses a walker in his condo due to fear of falling.  Worsening over the past 1-2 years.    Getting tirzepatide from a compound pharmacy, currently 60cc per week.  Has been on it since mid-2023.  Started at around 300#, down about 35lbs.  Actually a bit higher than last visit.  Could eat healthier although appetite is controlled.         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 Travel to [place] between December and April.  Remote tobacco use. No alcohol use.  Trying to get back on his elliptical daily.     Providers:  Orthopedic surgery-Dr. Chi  Hematology oncology-Dr. Soto --> [place]  Dermatology-Dr. Tye Preston  Spine surgery-Dr. Grzegorz Gaona, history of lumbar fusion  General surgery-Dr. Spicer ventral hernia repair  GI-Dr. Hay-->Rodney, colonoscopy and Gonzalez's esophagus   Urology - Dr. Hines  A/I - Dr. Amie Yancey  ENT - Dr. Marina      Review of Systems    Objective   /74 (BP Location: Right arm, Patient Position: Sitting, BP Cuff Size: Large adult)   Pulse 79   Temp 36.3 °C (97.3 °F) (Temporal)   Wt 121 kg (267 lb)   SpO2 97%   BMI 36.21 kg/m²     Physical Exam  Gen: NAD, pleasant, A&;Ox3  HEENT: PERRL, EOMI, MMM, OP clear, boggy, pale nares  Neck: supple, no  thyromegaly, no JVD, normal carotid upstroke  Pulm: lungs CTAB, good air movement  CV: RRR, no m/r/g, 2+ DP pulses  Abd: NABS, soft, NT, ND no HSM  Ext: 1+ bilateral lower extremity peripheral edema  Neuro: CN II-XII intact, no focal sensory or motor deficits, normal reflexes, slight issues with balance especially with turns        Assessment/Plan     BETY: Moderate with nocturnal hypoxia, 100% compliance on CPAP; using and benefitting from treatment; continue; s/p nasal surgery in 10/2022   - follow-up with sleep medicine     Obesity: With prediabetes/IFG, trial of GLP-1 agonist with good response to therapy: 35 pounds since starting, from compounding private distributor Tirzepatide 60cc weekly     PND/cough/allergic rhinitis:  - continue Xyzal  - added Flonase, ipratroprium and Astelin by A/I, stopped due to lack of benefit, consider going back on Singulair     Edema: mostly right, post TKA; recommend elevation and compression; moisturization to prevent recurrent cellulitis      Increased reflux, and hx of Gonzalez's: improved with Change PPI to omeprazole twice daily, refer back to GI, last Upper endoscopy in 2022, no mention of Gonzalez's on endoscopy or pathology result; unable to wean down to daily and now with chronic cough     Hypertension: controlled on current regimen, continue amlodipine 10 mg and losartan-HCTZ 100-25 mg; work on CV exercise ( avg 30min/day on stationary recumbent bike)     CAD/hyperlipidemia: High risk, optimize risk factors and cholesterol levels, currently on atorvastatin 40 mg and aspirin 81 mg  - vascular duplex in 2021 showed no aneurysm     BPH and PSA elevation: now seeing Dr. Hines; PSA 6/4/2024      Balance: referred for PT     Health maintenance  -Last colonoscopy: 10/2018, repeat 5 years,  referred back to Dr. Stevens (MAY 2024)  -Smoking history: Remote, negative AAA JAN 2021  -Counseled regarding diet and exercise  -Immunizations: annual flu, COVID x4, o/w utd  -Followup in 6  months

## 2025-05-05 NOTE — PATIENT INSTRUCTIONS
[facility] Sleep Medicine  DO [address]  [address] [address]       NAME: Papi Lou   DATE: 05/05/25    Your Sleep Provider Today: Kala Rainey PA-C  Your Primary Care Physician: Drake Crow MD   Your Referring Provider: No ref. provider found    DIAGNOSIS:   1. Obstructive sleep apnea syndrome            Thank you for coming to the Sleep Medicine Clinic today! Your sleep medicine provider today was: Kala Rainey PA-C Below is a summary of your treatment plan, other important information, and our contact numbers:      TREATMENT PLAN     Limit caffeine to 2 servings before noon (or less)  Try to limit TV and screens the 1 hour before bed  Try to get some daily exercise/physical activity, keep 3+ hours before bed  AVOID naps/dozing, especially anything longer than 15-20 minutes   Give the Airtouch N30i mask     Return to clinic 3 months       IMPORTANT INFORMATION     Call 911 for medical emergencies.  Our offices are generally open from Monday-Friday, 9 am - 5 pm.  If you need to get in touch with me, you may either call me and my team(number is below) or you can use [employer].  If a referral for a test, for CPAP, or for another specialist was made, and you have not heard about scheduling this within a week, please call scheduling at [phone]).  If you are unable to make your appointment for clinic or an overnight study, kindly call the office at least 48 hours in advance to cancel and reschedule.  If you are on CPAP, please bring your device's card or the device to each clinic appointment.   There are no supporting services by either the sleep doctors or their staff on weekends and Holidays, or after 5 PM on weekdays.   If you have been asked to come to a sleep study, make sure you bring toiletries, a comfy pillow, and any nighttime medications that you may regularly take. Also be sure to eat dinner before you arrive. We  generally do not provide meals.      PRESCRIPTIONS     We require 7 days advanced notice for prescription refills. If we do not receive the request in this time, we cannot guarantee that your medication will be refilled in time.      IMPORTANT PHONE NUMBERS     Sleep Medicine Clinic Fax: [phone]  Appointments (for Adult Sleep Clinic): [phone] - option 2  Appointments (For Sleep Studies): [phone] - option 3  [facility] (DME): [phone]  Behavioral Sleep Medicine: [phone]  Sleep Surgery: [phone]  ENT (Otolaryngology): [phone]  Headache Clinic (Neurology): [phone]  Neurology: [phone]  Psychiatry: [phone]  Pulmonary Function Testing (PFT) Center: [phone]  Pulmonary Medicine: [phone]    [facility] (DME): [phone]  [facility] (DME): [phone]  [facility] ([facility]): [phone]      OUR ADULT SLEEP MEDICINE TEAM   Please do not hesitate to call the office or sleep nurse with any questions between appointments:    Adult Sleep Nurses (Ana Lilia Goode, KRISHNA and Melissa Weiner RN):  For clinical questions and refilling prescriptions: [phone]  Email sleep diaries and other documents at: [email or web address]    Adult Sleep Medicine Secretaries:  Maria Elena Condon (For Lis[place]/Hyde/Krise/[place]ro[place]/Yeh/[place]):   P: [phone]  F: [phone]  Isamar West (For [place]): P: [phone]  Fax: [phone]  Sydney Godron (For Jurcevic/Blank): P: [phone]  F: [phone]  Lyly Forde (For [place]): P: [phone]  F: [phone]  Halle Cardoza (For Shreya/Tonny/Zakhary): P: [phone]  F: [phone]  Mariza Hallman (For Vinny/Elier): P: [phone]  F: [phone]     Adult Sleep Medicine Advanced Practice Providers:  Landon Beltran ([place], [place])  Nalini Lancaster ([facility])  Edyta Garner CNP ([facility])  Molly Rainey, CNP  "([place], [place], [place])  Eleni Thapa CNP ([place], [place])        OUR SLEEP TESTING LOCATIONS     Our team will contact you to schedule your sleep study, however, you can contact us as follow:  Main Phone Line (scheduling only): [phone]), option 3  Adult and Pediatric Locations  [facility] (6 years and older): [facility] - 4th floor ([address] [facility] [address] [facility]) After hours line: [phone]  [facility] (Main campus: All ages): [facility], 6th floor. After hours line: [phone]   [place] (5 years and older; younger considered on case-by-case basis): [address]; [address], [address]. Scheduling  After hours line: [phone]   [place] (6 years and older): [address]; [address]; [address]   [place] (6 years and older): [address], [address]  After hours line: [phone]   [place] (13 years and older) in [place]: [address], 2nd floor  After hours line: [phone]  [facility] (13 year and older): [address]  After hours line: [phone]     Adult Only Locations:   [place] (18 years and older): [address], 2nd floor   [place] (18 years and older): [address]; 4th floor  After hours line: [phone]  [facility] (18 years and older) at [place]: [address]  After hours line: [phone]          CONTACTING YOUR SLEEP MEDICINE PROVIDER     Send a message directly to your provider through \"My Chart\", which is the email service through your  Records Account: [email or web address]   Call [phone] and leave a message. One of the administrative assistants will forward the message to your sleep medicine provider through \"My Chart\" and/or email.     Your sleep medicine provider for this visit was: Kala Rainey PA-C    "

## 2025-05-05 NOTE — PROGRESS NOTES
Patient: Papi Lou    [id number]  : 1945 -- AGE 79 y.o.    Provider: Kala Rainey PA-C     Location [facility]   Service Date: 2025              [facility] Clinic  Followup Visit Note    HISTORY OF PRESENT ILLNESS     HISTORY OF PRESENT ILLNESS   Papi Lou is a 79 y.o. male with h/o  BETY, obesity, GERD, CLL, BPH, HTN, multiple comorbidities  who presents to a [facility] Sleep Medicine Clinic for followup.     Past Sleep History  Patient has had a sleep study in the past. The record is not available in clinic today. Diagnosed int he early .    Assessment and plan from last visit: 10/15/2024-   Obstructive sleep apnea syndrome - Primary G47.33        -diagnosed early , testing not on file  -very compliant with cpap, uses nightly and rAHI ~6 with large leak   -troubleshooting in clinic --> provided P30i sample fitted to medium cushion --> did very well with this and found much more comfortable; not major air leak noted during troubleshoot  -adjust pressure settings manually + will follow with an order to [facility] from CPAP 13 --> AutoPAP 6-13cwp - liked this change as well during clinic, felt much better than the fixed cpap setting  -discussed that PCP hd ordered HSAT. At this time, it is reasonable to forgo the testing as he is not in need of new equipment, his rAHI was decently controlled and issue more seems to be comfort  -However, if he struggles with use over the next several weeks, we will reconsider study, potentially in lab/split night or titration   -aware to bring all equpiment to future appointments  -will notify DME of mask sample provided + request the Climate line tubing per patient request and ease of connecting to the device  -avoid drowsy driving               Current History    On today's visit, the patient reports sleep is still very disrupted. No real improvement from last visit. Spent winter in  [place], did not reach out to me since last visit.   Reverted back to nasal mask but using a padding on the nasal bridge to prevent irritation, did not like the nasal pillows, found irritating in his nostrils.   Continues to wear cpap nightly without taking any nights off.     Main concern with sleep is that he has multiple brief awakenings all night long, states maybe 6+ awakenings per night.     Reviewed habits today  Is currently drinking 4 cups of coffee per day -> willing to switch to half decaf or phase caffeiene   Naps around 4P x20-30 min, evening dozing     Does doze off around 4PM after work, can be up to 30 minutes or so. Additionally also dozes off in the evenings while watching TV right before bedtime. Does routinely watch TV before bed.  Does look at the clock during his wakings.  Unsure what is causing the wake ups, able to get back to sleep quickly.       Sleep schedule:  In bed: 11P or so AFTER having dozed usually in living room watching TV for some time  Subjective sleep latency:  10 minutes some nights, but ~half of the nights can be 30+ minutes  Awakenings during night:  6 - brief, unsure what wakes him up, ?possible cpap air leak   Length of awakenings:  very brief   Final awakening time:  6:45AM   Naps: 4PM x20-30 minutes  + EVENING DOZING - living room/TV     Overall estimate of total sleep time: 7.5 hours   Weekends/Days off: same        REVIEW OF SYSTEMS     REVIEW OF SYSTEMS  See HPI; all other ROS were reviewed and negative for compliant      ALLERGIES AND MEDICATIONS     ALLERGIES  Allergies[1]    MEDICATIONS: He has a current medication list which includes the following prescription(s): amlodipine - TAKE 1 TABLET DAILY, ascorbic acid (vitamin c) - Take by mouth, aspirin, atorvastatin - TAKE 1 TABLET DAILY, azelastine - Administer 2 sprays into each nostril once daily. Use in each nostril as directed, bacillus coagulans/inulin - Take by mouth, cholecalciferol - Take by mouth, coenzyme  q-10 - Take by mouth, cyanocobalamin - Take by mouth, fluticasone - Administer 2 sprays into each nostril once daily, ipratropium - Administer 2 sprays into each nostril once daily, l. acidophilus/bifid. animalis - Take by mouth, levocetirizine - Take by mouth once daily in the evening, losartan-hydrochlorothiazide - TAKE 1 TABLET DAILY, centrum silver - Take by mouth, olopatadine - INSTILL ONE DROP INTO AFFECTED EYE TWICE DAILY, omeprazole - TAKE 1 CAPSULE TWICE DAILY 30 MINUTES BEFORE MEALS, metamucil (with sugar) - Take 1 capsule by mouth 2 times a day, tamsulosin - TAKE 1 CAPSULE DAILY, mounjaro - Inject 7.5 mg under the skin 1 (one) time per week, and tirzepatide (weight loss) - Inject 10 mg under the skin every 7 days.    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), Benign colonic polyp (06/02/2023), Chronic allergic conjunctivitis (12/16/2021), Chronic maxillary sinusitis (06/02/2023), Chronic rhinitis (04/09/2014), Diverticulosis of large intestine without hemorrhage (10/09/2018), History of Bell's palsy (12/16/2021), Lumbar radiculopathy (06/02/2023), Male erectile disorder (06/02/2023), Malignant neoplasm of skin (12/16/2021), Nasal septal deviation (06/02/2023), Personal history of other diseases of the digestive system, Personal history of other diseases of the musculoskeletal system and connective tissue (07/29/2013), Personal history of other diseases of the nervous system and sense organs, Personal history of pneumonia (recurrent) (03/05/2014), Pseudophakia of both eyes (01/22/2021), Rupture of popliteal cyst (06/17/2014), Sensorineural hearing loss, bilateral (06/02/2023), Spinal stenosis of lumbar region (02/08/2018), Spinal  stenosis, lumbar region without neurogenic claudication (2015), and Toxic effect of venom of bees, accidental (unintentional), initial encounter.    PAST SURGICAL HISTORY: He  has a past surgical history that includes Knee surgery (2013); Ventral hernia repair (2013); Back surgery (10/01/2013); Other surgical history (2019); and CT angio head w and wo IV contrast (2013).     FAMILY HISTORY: No changes since previous visit. Otherwise non-contributory as charted.     SOCIAL HISTORY  He  reports that he has quit smoking. His smoking use included cigarettes. He has been exposed to tobacco smoke. He has never used smokeless tobacco. He reports current alcohol use. He reports that he does not use drugs.       PHYSICAL EXAM     VITAL SIGNS: There were no vitals taken for this visit.       PREVIOUS WEIGHTS:  Wt Readings from Last 3 Encounters:   25 121 kg (267 lb)   10/14/24 124 kg (273 lb)   10/14/24 124 kg (273 lb 3.2 oz)       Constitutional: Alert and oriented, cooperative, no acute distress  Head: Normocephalic, atraumatic   Cranial Features: No abnormal craniofacial features  Neck: Supple. Trachea midline.  Pulmonary: Non-labored breathing, speaks in full sentences. No cough.    Cardiac: regular rate   Extremities: No clubbing, no edema  Neuromuscular: Cranial nerves grossly intact, no focal deficits      RESULTS/DATA     Bicarbonate (mmol/L)   Date Value   2021 31   2021 30   2021 29       PAP Adherence  Reviewed on device--     Pressure settin-13  Use 4+ hours in last 30 days: 100%   Average use, days used: 7.6 hours    Leak 95th percentile: 60L /min   rAHI: 4    ASSESSMENT/PLAN     Mr. Lou is a 79 y.o. male and he returns in followup to the [facility] Clinic for BETY and Insomnia.    Problem List, Orders, Assessment, Recommendations:  Problem List Items Addressed This Visit           ICD-10-CM    Obstructive sleep apnea syndrome -  Primary G47.33    -Fitted to N30i medium sample in clinic today; finds this comfortable  -current nasal mask is causing sores on nasal bridge, having to insert padding on his nasal bridge; did not like nasal pillows provided last visit  -using cpap regularly, rAHI controlled, otherwise experiences benefit  -avoid drowsy driving          Other insomnia G47.09    -Limit caffeine to 2 servings before noon (or less)  -Try to limit TV and screens the 1 hour before bed  -Try to get some daily exercise/physical activity, keep 3+ hours before bed  -AVOID naps/dozing, especially anything longer than 15-20 minutes (currently napping around 4PM and some evening dozing)  -Give the Airtouch N30i mask                 Disposition    Return to clinic in 3-4 months                [1]   Allergies  Allergen Reactions    Bee Venom Protein (Honey Bee) Anaphylaxis    Ace Inhibitors Cough

## 2025-05-06 PROBLEM — G47.09 OTHER INSOMNIA: Status: ACTIVE | Noted: 2025-05-06

## 2025-05-06 LAB
ALBUMIN SERPL-MCNC: 4.7 G/DL (ref 3.6–5.1)
ALP SERPL-CCNC: 69 U/L (ref 35–144)
ALT SERPL-CCNC: 27 U/L (ref 9–46)
ANION GAP SERPL CALCULATED.4IONS-SCNC: 10 MMOL/L (CALC) (ref 7–17)
AST SERPL-CCNC: 27 U/L (ref 10–35)
BASOPHILS # BLD AUTO: 35 CELLS/UL (ref 0–200)
BASOPHILS NFR BLD AUTO: 0.2 %
BILIRUB SERPL-MCNC: 0.6 MG/DL (ref 0.2–1.2)
BUN SERPL-MCNC: 21 MG/DL (ref 7–25)
CALCIUM SERPL-MCNC: 9.6 MG/DL (ref 8.6–10.3)
CHLORIDE SERPL-SCNC: 103 MMOL/L (ref 98–110)
CHOLEST SERPL-MCNC: 154 MG/DL
CHOLEST/HDLC SERPL: 2.6 (CALC)
CO2 SERPL-SCNC: 28 MMOL/L (ref 20–32)
CREAT SERPL-MCNC: 0.94 MG/DL (ref 0.7–1.28)
EGFRCR SERPLBLD CKD-EPI 2021: 83 ML/MIN/1.73M2
EOSINOPHIL # BLD AUTO: 634 CELLS/UL (ref 15–500)
EOSINOPHIL NFR BLD AUTO: 3.6 %
ERYTHROCYTE [DISTWIDTH] IN BLOOD BY AUTOMATED COUNT: 13.4 % (ref 11–15)
EST. AVERAGE GLUCOSE BLD GHB EST-MCNC: 114 MG/DL
EST. AVERAGE GLUCOSE BLD GHB EST-SCNC: 6.3 MMOL/L
GLUCOSE SERPL-MCNC: 92 MG/DL (ref 65–99)
HBA1C MFR BLD: 5.6 %
HCT VFR BLD AUTO: 42.8 % (ref 38.5–50)
HDLC SERPL-MCNC: 60 MG/DL
HGB BLD-MCNC: 13.7 G/DL (ref 13.2–17.1)
LDH SERPL P TO L-CCNC: 177 U/L (ref 120–250)
LDLC SERPL CALC-MCNC: 77 MG/DL (CALC)
LYMPHOCYTES # BLD AUTO: ABNORMAL CELLS/UL (ref 850–3900)
LYMPHOCYTES NFR BLD AUTO: 68.1 %
MCH RBC QN AUTO: 28.2 PG (ref 27–33)
MCHC RBC AUTO-ENTMCNC: 32 G/DL (ref 32–36)
MCV RBC AUTO: 88.2 FL (ref 80–100)
MONOCYTES # BLD AUTO: 810 CELLS/UL (ref 200–950)
MONOCYTES NFR BLD AUTO: 4.6 %
NEUTROPHILS # BLD AUTO: 4136 CELLS/UL (ref 1500–7800)
NEUTROPHILS NFR BLD AUTO: 23.5 %
NONHDLC SERPL-MCNC: 94 MG/DL (CALC)
PLATELET # BLD AUTO: 175 THOUSAND/UL (ref 140–400)
PMV BLD REES-ECKER: 10.6 FL (ref 7.5–12.5)
POTASSIUM SERPL-SCNC: 4.1 MMOL/L (ref 3.5–5.3)
PROT SERPL-MCNC: 7 G/DL (ref 6.1–8.1)
PSA SERPL-MCNC: 1.63 NG/ML
RBC # BLD AUTO: 4.85 MILLION/UL (ref 4.2–5.8)
SODIUM SERPL-SCNC: 141 MMOL/L (ref 135–146)
TRIGL SERPL-MCNC: 85 MG/DL
WBC # BLD AUTO: 17.6 THOUSAND/UL (ref 3.8–10.8)

## 2025-05-06 NOTE — ASSESSMENT & PLAN NOTE
-Fitted to N30i medium sample in clinic today; finds this comfortable  -current nasal mask is causing sores on nasal bridge, having to insert padding on his nasal bridge; did not like nasal pillows provided last visit  -using cpap regularly, rAHI controlled, otherwise experiences benefit  -avoid drowsy driving

## 2025-05-06 NOTE — ASSESSMENT & PLAN NOTE
-Limit caffeine to 2 servings before noon (or less)  -Try to limit TV and screens the 1 hour before bed  -Try to get some daily exercise/physical activity, keep 3+ hours before bed  -AVOID naps/dozing, especially anything longer than 15-20 minutes (currently napping around 4PM and some evening dozing)  -Give the Geospizauch N30i mask

## 2025-05-12 ENCOUNTER — TELEPHONE (OUTPATIENT)
Dept: PRIMARY CARE | Facility: CLINIC | Age: 80
End: 2025-05-12
Payer: MEDICARE

## 2025-05-12 NOTE — TELEPHONE ENCOUNTER
Patient  wife called he needs a referral for  balance solution for his balance .   [phone] fax number

## 2025-05-20 DIAGNOSIS — G47.33 SLEEP APNEA, OBSTRUCTIVE: ICD-10-CM

## 2025-06-19 ENCOUNTER — OFFICE VISIT (OUTPATIENT)
Dept: UROLOGY | Facility: HOSPITAL | Age: 80
End: 2025-06-19
Payer: MEDICARE

## 2025-06-19 DIAGNOSIS — N40.1 BENIGN PROSTATIC HYPERPLASIA WITH LOWER URINARY TRACT SYMPTOMS, SYMPTOM DETAILS UNSPECIFIED: Primary | ICD-10-CM

## 2025-06-19 PROCEDURE — 99214 OFFICE O/P EST MOD 30 MIN: CPT | Performed by: UROLOGY

## 2025-06-19 PROCEDURE — G2211 COMPLEX E/M VISIT ADD ON: HCPCS | Performed by: UROLOGY

## 2025-06-19 PROCEDURE — 1159F MED LIST DOCD IN RCRD: CPT | Performed by: UROLOGY

## 2025-06-19 NOTE — PROGRESS NOTES
FUV    Last visit - 6/14/24     HISTORY OF PRESENT ILLNESS:   Papi Lou is a 79 y.o. male who is being seen today for annual FUV with PSA   -family history of metastatic prostate cancer in father and history of BPH   -on Tamsulosin 0.4mg    PSA trend:  Lab Results   Component Value Date    PSA 1.63 05/05/2025    PSA 1.40 06/04/2024    PSA 1.78 06/02/2023    PSA 1.75 11/03/2022    PSA 1.40 09/28/2022    PSA 2.35 04/25/2022    PSA 1.37 06/16/2021       PAST MEDICAL HISTORY:  Past Medical History:   Diagnosis Date    Abnormal electrocardiogram (ECG) (EKG) 10/22/2013    Abnormal electrocardiogram    Abnormal finding of blood chemistry, unspecified 08/13/2013    Abnormal blood chemistry    Aneurysm of iliac artery 04/25/2022    Iliac artery aneurysm, bilateral    Aneurysm of iliac artery 04/25/2022    Iliac artery aneurysm, bilateral    Aneurysm of iliac artery 04/25/2022    Iliac artery aneurysm, bilateral    Benign colonic polyp 06/02/2023    Chronic allergic conjunctivitis 12/16/2021    Chronic maxillary sinusitis 06/02/2023    Chronic rhinitis 04/09/2014    Rhinitis    Diverticulosis of large intestine without hemorrhage 10/09/2018    Last Assessment & Plan: Formatting of this note might be different from the original. Assessment: Is s/p jeimy-colectomy    History of Bell's palsy 12/16/2021    Lumbar radiculopathy 06/02/2023    Male erectile disorder 06/02/2023    Malignant neoplasm of skin 12/16/2021    Nasal septal deviation 06/02/2023    Personal history of other diseases of the digestive system     History of diverticulitis of colon    Personal history of other diseases of the musculoskeletal system and connective tissue 07/29/2013    History of bursitis    Personal history of other diseases of the nervous system and sense organs     History of sleep apnea    Personal history of pneumonia (recurrent) 03/05/2014    History of pneumonia    Pseudophakia of both eyes 01/22/2021    Rupture of popliteal cyst  06/17/2014    Baker's cyst, ruptured    Sensorineural hearing loss, bilateral 06/02/2023    Spinal stenosis of lumbar region 02/08/2018    Last Assessment & Plan: Formatting of this note might be different from the original. 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       PAST SURGICAL HISTORY:  Past Surgical History:   Procedure Laterality Date    BACK SURGERY  10/01/2013    Back Surgery    CT HEAD ANGIO W AND WO IV CONTRAST  4/4/2013    CT HEAD ANGIO W AND WO IV CONTRAST 4/4/2013 [facility]    KNEE SURGERY  07/29/2013    Knee Surgery    OTHER SURGICAL HISTORY  12/13/2019    Laparoscopic partial colectomy    VENTRAL HERNIA REPAIR  07/29/2013    Ventral Hernia Repair        ALLERGIES:   Allergies   Allergen Reactions    Bee Venom Protein (Honey Bee) Anaphylaxis    Ace Inhibitors Cough        MEDICATIONS:   Current Outpatient Medications   Medication Instructions    amLODIPine (NORVASC) 10 mg, oral, Daily    ascorbic acid, vitamin C, 500 mg capsule Take by mouth.    aspirin 81 mg EC tablet     atorvastatin (LIPITOR) 40 mg, oral, Daily    azelastine (Astelin) 137 mcg (0.1 %) nasal spray 2 sprays, Each Nostril, Daily, Use in each nostril as directed    BACILLUS COAGULANS-INULIN ORAL Take by mouth.    cholecalciferol (Vitamin D-3) 125 MCG (5000 UT) capsule Take by mouth.    coenzyme Q-10 200 mg capsule Take by mouth.    cyanocobalamin (Vitamin B-12) 500 mcg tablet Take by mouth.    fluticasone (Flonase Allergy Relief) 50 mcg/actuation nasal spray 2 sprays, Each Nostril, Daily RT    ipratropium (Atrovent) 21 mcg (0.03 %) nasal spray 2 sprays, Each Nostril, Daily    L. acidophilus/Bifid. animalis 15.5 billion cell capsule Take by mouth.    levocetirizine (Xyzal) 5 mg tablet Every evening  "   losartan-hydrochlorothiazide (Hyzaar) 100-25 mg tablet 1 tablet, oral, Daily    Mounjaro 7.5 mg, subcutaneous, Weekly    multivit-iron-minerals-folic acid (Centrum Silver) 0.4 mg-300 mcg- 250 mcg tab Take by mouth.    olopatadine (Patanol) 0.1 % ophthalmic solution INSTILL ONE DROP INTO AFFECTED EYE TWICE DAILY    omeprazole (PriLOSEC) 40 mg DR capsule TAKE 1 CAPSULE TWICE DAILY 30 MINUTES BEFORE MEALS    psyllium husk, with sugar, (Metamucil, with sugar,) 3.4 gram/12 gram powder 1 capsule, 2 times daily    tamsulosin (FLOMAX) 0.4 mg, oral, Daily    tirzepatide (weight loss) (ZEPBOUND) 10 mg, subcutaneous, Every 7 days        PHYSICAL EXAM:  There were no vitals taken for this visit.  Constitutional: Patient appears well-developed and well-nourished. No distress.    Pulmonary/Chest: Effort normal. No respiratory distress.   Abdominal: Soft, ND NT  : WNL  Musculoskeletal: Normal range of motion.    Neurological: Alert and oriented to person, place, and time.  Psychiatric: Normal mood and affect. Behavior is normal. Thought content normal.      Labs:  No results found for: \"TESTOSTERONE\"  Lab Results   Component Value Date    PSA 1.63 05/05/2025     Lab Results   Component Value Date    PSA 1.63 05/05/2025    PSA 1.40 06/04/2024    PSA 1.78 06/02/2023    PSA 1.75 11/03/2022    PSA 1.40 09/28/2022    PSA 2.35 04/25/2022    PSA 1.37 06/16/2021    PSA 1.65 06/16/2020       No components found for: \"CBC\"  Lab Results   Component Value Date    CREATININE 0.94 05/05/2025     No components found for: \"TESTOTMS\"  No results found for: \"TESTF\"    Imaging:    PVR: 8 cc    Discussion: PSA level reviewed with the patient, currently 1.63. He is experiencing urgency. I presented the option to the patient to be given a medication to assist with his frequency. He would like to defer at this time. The patient underwent a PVR today in office, the result was low indicating he is emptying his bladder completely.He indicates that his " erections are not a priority at this time. Will follow up in 1 year with PSA prior or sooner if urgency gets bothersome.     Assessment:      No diagnosis found.      Papi Lou is a 79 y.o. male here for FUV     Plan:   follow up in 1 year with PSA prior  All questions and concerns were addressed. Patient verbalizes understanding and has no other questions at this time.     Scribe Attestation  By signing my name below, IKody , Scribe   attest that this documentation has been prepared under the direction and in the presence of Jd Hines MD.

## 2025-07-07 ENCOUNTER — APPOINTMENT (OUTPATIENT)
Dept: HEMATOLOGY/ONCOLOGY | Facility: CLINIC | Age: 80
End: 2025-07-07
Payer: MEDICARE

## 2025-07-11 DIAGNOSIS — K21.9 GERD WITHOUT ESOPHAGITIS: ICD-10-CM

## 2025-07-11 RX ORDER — OMEPRAZOLE 40 MG/1
CAPSULE, DELAYED RELEASE ORAL
Qty: 180 CAPSULE | Refills: 1 | Status: SHIPPED | OUTPATIENT
Start: 2025-07-11

## 2025-08-22 ENCOUNTER — OFFICE VISIT (OUTPATIENT)
Dept: OTOLARYNGOLOGY | Facility: CLINIC | Age: 80
End: 2025-08-22
Payer: MEDICARE

## 2025-08-22 VITALS — HEIGHT: 72 IN | WEIGHT: 263 LBS | BODY MASS INDEX: 35.62 KG/M2

## 2025-08-22 DIAGNOSIS — H90.3 SENSORINEURAL HEARING LOSS (SNHL) OF BOTH EARS: ICD-10-CM

## 2025-08-22 DIAGNOSIS — J34.89 RHINORRHEA: ICD-10-CM

## 2025-08-22 DIAGNOSIS — R09.81 NASAL CONGESTION: ICD-10-CM

## 2025-08-22 DIAGNOSIS — H61.22 IMPACTED CERUMEN OF LEFT EAR: Primary | ICD-10-CM

## 2025-08-22 PROCEDURE — 99213 OFFICE O/P EST LOW 20 MIN: CPT | Performed by: NURSE PRACTITIONER

## 2025-08-22 PROCEDURE — 1160F RVW MEDS BY RX/DR IN RCRD: CPT | Performed by: NURSE PRACTITIONER

## 2025-08-22 PROCEDURE — 1159F MED LIST DOCD IN RCRD: CPT | Performed by: NURSE PRACTITIONER

## 2025-08-28 ENCOUNTER — APPOINTMENT (OUTPATIENT)
Dept: OTOLARYNGOLOGY | Facility: CLINIC | Age: 80
End: 2025-08-28
Payer: MEDICARE

## 2025-09-12 ENCOUNTER — APPOINTMENT (OUTPATIENT)
Dept: SLEEP MEDICINE | Facility: CLINIC | Age: 80
End: 2025-09-12
Payer: MEDICARE

## 2025-09-29 ENCOUNTER — APPOINTMENT (OUTPATIENT)
Dept: SLEEP MEDICINE | Facility: CLINIC | Age: 80
End: 2025-09-29
Payer: MEDICARE

## 2025-11-04 ENCOUNTER — APPOINTMENT (OUTPATIENT)
Dept: PRIMARY CARE | Facility: CLINIC | Age: 80
End: 2025-11-04
Payer: MEDICARE